# Patient Record
Sex: FEMALE | Race: OTHER
[De-identification: names, ages, dates, MRNs, and addresses within clinical notes are randomized per-mention and may not be internally consistent; named-entity substitution may affect disease eponyms.]

---

## 2021-04-13 ENCOUNTER — HOSPITAL ENCOUNTER (INPATIENT)
Dept: HOSPITAL 54 - TELE | Age: 76
LOS: 4 days | Discharge: HOME | DRG: 720 | End: 2021-04-17
Attending: NURSE PRACTITIONER | Admitting: NURSE PRACTITIONER
Payer: MEDICAID

## 2021-04-13 VITALS — DIASTOLIC BLOOD PRESSURE: 75 MMHG | SYSTOLIC BLOOD PRESSURE: 154 MMHG

## 2021-04-13 VITALS — DIASTOLIC BLOOD PRESSURE: 74 MMHG | SYSTOLIC BLOOD PRESSURE: 157 MMHG

## 2021-04-13 VITALS — HEIGHT: 61 IN | WEIGHT: 127 LBS | BODY MASS INDEX: 23.98 KG/M2

## 2021-04-13 VITALS — SYSTOLIC BLOOD PRESSURE: 126 MMHG | DIASTOLIC BLOOD PRESSURE: 72 MMHG

## 2021-04-13 DIAGNOSIS — J90: ICD-10-CM

## 2021-04-13 DIAGNOSIS — A41.9: Primary | ICD-10-CM

## 2021-04-13 DIAGNOSIS — Z90.12: ICD-10-CM

## 2021-04-13 DIAGNOSIS — Z80.9: ICD-10-CM

## 2021-04-13 DIAGNOSIS — K80.20: ICD-10-CM

## 2021-04-13 DIAGNOSIS — E87.1: ICD-10-CM

## 2021-04-13 DIAGNOSIS — R74.01: ICD-10-CM

## 2021-04-13 DIAGNOSIS — I10: ICD-10-CM

## 2021-04-13 DIAGNOSIS — Z85.3: ICD-10-CM

## 2021-04-13 DIAGNOSIS — R91.8: ICD-10-CM

## 2021-04-13 DIAGNOSIS — E78.5: ICD-10-CM

## 2021-04-13 DIAGNOSIS — E11.9: ICD-10-CM

## 2021-04-13 DIAGNOSIS — I31.3: ICD-10-CM

## 2021-04-13 DIAGNOSIS — J69.0: ICD-10-CM

## 2021-04-13 DIAGNOSIS — Z90.710: ICD-10-CM

## 2021-04-13 DIAGNOSIS — K76.9: ICD-10-CM

## 2021-04-13 LAB
BASOPHILS # BLD AUTO: 0.1 /CMM (ref 0–0.2)
BASOPHILS NFR BLD AUTO: 0.6 % (ref 0–2)
BUN SERPL-MCNC: 8 MG/DL (ref 7–18)
CALCIUM SERPL-MCNC: 8.8 MG/DL (ref 8.5–10.1)
CHLORIDE SERPL-SCNC: 104 MMOL/L (ref 98–107)
CO2 SERPL-SCNC: 26 MMOL/L (ref 21–32)
CREAT SERPL-MCNC: 0.8 MG/DL (ref 0.6–1.3)
EOSINOPHIL NFR BLD AUTO: 6.7 % (ref 0–6)
EOSINOPHIL NFR BLD MANUAL: 5 % (ref 0–4)
GLUCOSE SERPL-MCNC: 92 MG/DL (ref 74–106)
HCT VFR BLD AUTO: 38 % (ref 33–45)
HGB BLD-MCNC: 12.6 G/DL (ref 11.5–14.8)
LYMPHOCYTES NFR BLD AUTO: 1.5 /CMM (ref 0.8–4.8)
LYMPHOCYTES NFR BLD AUTO: 14.9 % (ref 20–44)
LYMPHOCYTES NFR BLD MANUAL: 14 % (ref 16–48)
MCHC RBC AUTO-ENTMCNC: 33 G/DL (ref 31–36)
MCV RBC AUTO: 85 FL (ref 82–100)
MONOCYTES NFR BLD AUTO: 1.5 /CMM (ref 0.1–1.3)
MONOCYTES NFR BLD AUTO: 14.3 % (ref 2–12)
MONOCYTES NFR BLD MANUAL: 14 % (ref 0–11)
NEUTROPHILS # BLD AUTO: 6.5 /CMM (ref 1.8–8.9)
NEUTROPHILS NFR BLD AUTO: 63.5 % (ref 43–81)
NEUTS SEG NFR BLD MANUAL: 67 % (ref 42–76)
PLATELET # BLD AUTO: 351 /CMM (ref 150–450)
POTASSIUM SERPL-SCNC: 3.8 MMOL/L (ref 3.5–5.1)
RBC # BLD AUTO: 4.49 MIL/UL (ref 4–5.2)
SODIUM SERPL-SCNC: 138 MMOL/L (ref 136–145)
WBC NRBC COR # BLD AUTO: 10.3 K/UL (ref 4.3–11)

## 2021-04-13 PROCEDURE — A4216 STERILE WATER/SALINE, 10 ML: HCPCS

## 2021-04-13 PROCEDURE — G0378 HOSPITAL OBSERVATION PER HR: HCPCS

## 2021-04-13 RX ADMIN — INSULIN HUMAN PRN UNITS: 100 INJECTION, SOLUTION PARENTERAL at 17:31

## 2021-04-13 RX ADMIN — TAMOXIFEN CITRATE SCH MG: 10 TABLET, FILM COATED ORAL at 13:08

## 2021-04-13 RX ADMIN — Medication SCH EACH: at 22:18

## 2021-04-13 RX ADMIN — Medication SCH EACH: at 13:10

## 2021-04-13 RX ADMIN — Medication SCH MLS/HR: at 22:54

## 2021-04-13 RX ADMIN — Medication SCH EACH: at 17:29

## 2021-04-13 RX ADMIN — Medication SCH ML: at 18:42

## 2021-04-13 RX ADMIN — Medication SCH MLS/HR: at 15:56

## 2021-04-13 NOTE — NUR
RN NOTES



PATIENT ARRIVED AT UNIT TO ROOM 327-2 [facility]IA [facility], ACCOMPANIED BY 2 EMTS, TRANSPORTED FROM 
[facility]. PATIENT AMBULATORY AND AMBULATED FROM GURNEY TO BED.

## 2021-04-13 NOTE — NUR
RN NOTES



PATIENT SEEN BY DR. HO W/ ORDER FOR US-GUIDED THORACENTESIS OF L LUNG SECONDARY TO 
PLEURAL EFFUSION. PROCEDURE UNDERSTOOD BY PATIENT AS SHE STATED SHE HAD IT BEFORE IN THE 
PAST. CONSENT FORM SIGNED BY PATIENT AND PLACED IN CHART.

## 2021-04-13 NOTE — NUR
TELE RN ADMITTING NOTES



ADMITTED THIS 76-Y/O FEMALE FROM [facility] W/ ADMITTING DIAGNOSIS OF SEPSIS/COUGH AND 
MEDICAL HISTORY OF DM, HTN, HLD, HX OF BREAST CA AND MASTECTOMY, ADMITTED BY CARLOS A OAKLEY NP. PATIENT IS AWAKE AND VERBALLY RESPONSIVE, A/O X3-4, TAGALOG-SPEAKING BUT UNDERSTANDS 
ENGLISH LANGUAGE. BREATHING EVEN AND UNLABORED, TOLERATING ROOM AIR; NOTED W/ OCCASIONAL 
NON-PRODUCTIVE COUGH. NO SWALLOWING ISSUES NOTED. PATIENT VERBALIZED THAT SHE HAS UPPER 
DENTURES. RECENT TRA[place]EL [place] TO THE [facility]. AMBULATORY W/ STEADY GAIT, 
MINIMAL ASSISTANCE AS NEEDED, CONTINENT OF BOWEL AND BLADDER. IV LINE ON RAC #22 INTACT AND 
PATENT. NO SKIN ISSUES NOTED. DTR-IN-LAW AND PATIENT'S SON AWARE OF PATIENT'S ADMISSION. 
SAFETY PRECAUTIONS INSTITUTED: BED LOCKED AND ON LOWEST POSITION, SR UP X2, CALL LIGHT USE 
DEMONSTRATED TO PATIENT FOR STAFF ASSISTANCE. WILL CONTINUE TO MONITOR.

## 2021-04-13 NOTE — NUR
MS/RN OPENING NOTES 



RECEIVED PATIENT IN BED RESTING. PATIENT IS ALERT AND ORIENTED X 3-4. PATIENT BREATHING IS 
EVEN AND UNLABORED. NO SIGNS OF SOB OR RESPIRATORY DISTRESS NOTED. PATIENT STATES NO PAIN AT 
THIS TIME. IV ACCESS IN PLACE. PATIENT SON IS AT BEDSIDE. SAFETY MEASURES ARE IN PLACE, BED 
LOCKED AND PLACED IN THE LOWEST POSITION, SIDE RAILS UP X 2, CALL LIGHT IS WITHIN REACH. 
WILL CONTINUE WITH PATIENT PLAN OF CARE.

## 2021-04-13 NOTE — NUR
RN NOTES



RECEIVED ROXANE CHANEY AT PATIENT'S BEDSIDE; PATIENT ABLE TO DRINK WITHOUT COMPLAINT OF 
NAUSEA/VOMITING.

## 2021-04-13 NOTE — NUR
TELE RN CLOSING NOTES



PATIENT IN BED AWAKE AND VERBALLY RESPONSIVE. A/O X3-4 ABLE TO MAKE NEEDS KNOWN. BREATHING 
EVEN AND UNLABORED, CONTINUES ON ROOM AIR, STILL W/ OCCASIONAL COUGH BUT NO DISCOMFORT 
VERBALIZED BY PATIENT. IV LINE INTACT AND PATENT. PATIENT ABLE TO EAT SMALL AMOUNT EARLIER 
W/O NAUSEA NOR VOMITING. PATIENT'S MEDICAL RECORDS FROM OUTSIDE HOSPITAL RELEASED AS 
CONSENTED BY PATIENT; COPY PLACED IN PATIENT'S CHART. SAFETY PRECS MAINTAINED. WILL ENDORSE 
TO NIGHT SHIFT RN FOR JORGE.

## 2021-04-14 VITALS — SYSTOLIC BLOOD PRESSURE: 133 MMHG | DIASTOLIC BLOOD PRESSURE: 67 MMHG

## 2021-04-14 VITALS — SYSTOLIC BLOOD PRESSURE: 161 MMHG | DIASTOLIC BLOOD PRESSURE: 92 MMHG

## 2021-04-14 VITALS — SYSTOLIC BLOOD PRESSURE: 152 MMHG | DIASTOLIC BLOOD PRESSURE: 67 MMHG

## 2021-04-14 VITALS — DIASTOLIC BLOOD PRESSURE: 65 MMHG | SYSTOLIC BLOOD PRESSURE: 130 MMHG

## 2021-04-14 VITALS — DIASTOLIC BLOOD PRESSURE: 69 MMHG | SYSTOLIC BLOOD PRESSURE: 154 MMHG

## 2021-04-14 LAB
BASOPHILS # BLD AUTO: 0.1 /CMM (ref 0–0.2)
BASOPHILS NFR BLD AUTO: 0.7 % (ref 0–2)
BUN SERPL-MCNC: 10 MG/DL (ref 7–18)
CALCIUM SERPL-MCNC: 9.2 MG/DL (ref 8.5–10.1)
CANCER AG15-3 SERPL-ACNC: 130 U/ML (ref 0–25)
CHLORIDE SERPL-SCNC: 103 MMOL/L (ref 98–107)
CHOLEST SERPL-MCNC: 150 MG/DL (ref ?–200)
CO2 SERPL-SCNC: 28 MMOL/L (ref 21–32)
CREAT SERPL-MCNC: 0.8 MG/DL (ref 0.6–1.3)
EOSINOPHIL NFR BLD AUTO: 6.4 % (ref 0–6)
GLUCOSE SERPL-MCNC: 180 MG/DL (ref 74–106)
HCT VFR BLD AUTO: 37 % (ref 33–45)
HDLC SERPL-MCNC: 49 MG/DL (ref 40–60)
HGB BLD-MCNC: 12.3 G/DL (ref 11.5–14.8)
LDLC SERPL DIRECT ASSAY-MCNC: 84 MG/DL (ref 0–99)
LYMPHOCYTES NFR BLD AUTO: 2.3 /CMM (ref 0.8–4.8)
LYMPHOCYTES NFR BLD AUTO: 22.4 % (ref 20–44)
MAGNESIUM SERPL-MCNC: 2 MG/DL (ref 1.8–2.4)
MCHC RBC AUTO-ENTMCNC: 33 G/DL (ref 31–36)
MCV RBC AUTO: 85 FL (ref 82–100)
MONOCYTES NFR BLD AUTO: 1.3 /CMM (ref 0.1–1.3)
MONOCYTES NFR BLD AUTO: 12.3 % (ref 2–12)
NEUTROPHILS # BLD AUTO: 6.1 /CMM (ref 1.8–8.9)
NEUTROPHILS NFR BLD AUTO: 58.2 % (ref 43–81)
PLATELET # BLD AUTO: 351 /CMM (ref 150–450)
POTASSIUM SERPL-SCNC: 3.8 MMOL/L (ref 3.5–5.1)
RBC # BLD AUTO: 4.36 MIL/UL (ref 4–5.2)
SODIUM SERPL-SCNC: 139 MMOL/L (ref 136–145)
TRIGL SERPL-MCNC: 101 MG/DL (ref 30–150)
WBC NRBC COR # BLD AUTO: 10.4 K/UL (ref 4.3–11)

## 2021-04-14 PROCEDURE — 0W9B3ZZ DRAINAGE OF LEFT PLEURAL CAVITY, PERCUTANEOUS APPROACH: ICD-10-PCS

## 2021-04-14 RX ADMIN — LOSARTAN POTASSIUM SCH MG: 50 TABLET, FILM COATED ORAL at 08:51

## 2021-04-14 RX ADMIN — Medication SCH EACH: at 06:50

## 2021-04-14 RX ADMIN — INSULIN HUMAN PRN UNITS: 100 INJECTION, SOLUTION PARENTERAL at 06:51

## 2021-04-14 RX ADMIN — AMLODIPINE BESYLATE SCH MG: 5 TABLET ORAL at 08:51

## 2021-04-14 RX ADMIN — Medication SCH MLS/HR: at 16:27

## 2021-04-14 RX ADMIN — INSULIN HUMAN PRN UNITS: 100 INJECTION, SOLUTION PARENTERAL at 17:56

## 2021-04-14 RX ADMIN — INSULIN HUMAN PRN UNITS: 100 INJECTION, SOLUTION PARENTERAL at 13:27

## 2021-04-14 RX ADMIN — Medication SCH ML: at 17:23

## 2021-04-14 RX ADMIN — TAMOXIFEN CITRATE SCH MG: 10 TABLET, FILM COATED ORAL at 09:00

## 2021-04-14 RX ADMIN — Medication SCH MLS/HR: at 06:38

## 2021-04-14 RX ADMIN — Medication SCH EACH: at 17:23

## 2021-04-14 RX ADMIN — Medication SCH ML: at 09:09

## 2021-04-14 RX ADMIN — DEXTROSE MONOHYDRATE SCH MLS/HR: 50 INJECTION, SOLUTION INTRAVENOUS at 06:03

## 2021-04-14 RX ADMIN — Medication SCH MLS/HR: at 22:34

## 2021-04-14 RX ADMIN — Medication SCH EACH: at 21:52

## 2021-04-14 RX ADMIN — Medication SCH EACH: at 13:22

## 2021-04-14 RX ADMIN — DEXTROSE MONOHYDRATE SCH MLS/HR: 50 INJECTION, SOLUTION INTRAVENOUS at 08:59

## 2021-04-14 RX ADMIN — PANTOPRAZOLE SODIUM SCH MG: 40 TABLET, DELAYED RELEASE ORAL at 08:51

## 2021-04-14 RX ADMIN — Medication SCH ML: at 13:25

## 2021-04-14 NOTE — NUR
MS RN OPENING NOTES



PATIENT IN BED WITH EYES CLOSED. NO S/S OF DISTRESS NOTED. NO C/O PAIN AT THE MOMENT. SAFETY 
PRECAUTIONS IN PLACE: BED LOWEST POSITION, BREAKS LOCKED, CALL LIGHT WITHIN REACH. WILL 
CONTINUE TO MONITOR.

## 2021-04-14 NOTE — NUR
ms rn

bs- 156 - patient refused coverage due to she just drink glucerna at this time, will recheck 
it at 5pm.

## 2021-04-14 NOTE — NUR
TELE/RN CLOSING NOTES 



PATIENT IN BED SLEEPING EASY TO AROUSE. PATIENT IS ALERT AND ORIENTED X 4. PATIENT BREATHING 
IS EVEN AND UNLABORED. PATIENT SHOWS NO SIGNS OF SOB OR RESPIRATORY DISTRESS. PATIENT STATES 
NO PAIN AT THIS TIME. ALL NEEDS HAVE BEEN MET DURING SHIFT. PATIENT HAS IV ACCESS ON RIGHT 
AC 22 G  INTACT FLUSHING WELL . SAFETY MEASURES ARE IN PLACE, BED IS LOCKED AND PLACED IN 
THE LOWEST POSITION, SIDE RAILS UP X 2, CALL LIGHT IS WITHIN REACH. WILL ENDORSE CARE TO DAY 
SHIFT NURSE.

## 2021-04-14 NOTE — NUR
ms rn

received on bed, awake,alert,oriented x4,not in any form of distress, respirations even and 
unlabored,no sob noted, lungs are diminish,abdomen soft,positive bowel sound,denies pain at 
this time,all needs attended.

## 2021-04-15 VITALS — SYSTOLIC BLOOD PRESSURE: 151 MMHG | DIASTOLIC BLOOD PRESSURE: 84 MMHG

## 2021-04-15 VITALS — SYSTOLIC BLOOD PRESSURE: 122 MMHG | DIASTOLIC BLOOD PRESSURE: 65 MMHG

## 2021-04-15 VITALS — DIASTOLIC BLOOD PRESSURE: 65 MMHG | SYSTOLIC BLOOD PRESSURE: 122 MMHG

## 2021-04-15 LAB
BASOPHILS # BLD AUTO: 0.1 /CMM (ref 0–0.2)
BASOPHILS NFR BLD AUTO: 0.7 % (ref 0–2)
BUN SERPL-MCNC: 8 MG/DL (ref 7–18)
CALCIUM SERPL-MCNC: 8.7 MG/DL (ref 8.5–10.1)
CHLORIDE SERPL-SCNC: 102 MMOL/L (ref 98–107)
CO2 SERPL-SCNC: 32 MMOL/L (ref 21–32)
CREAT SERPL-MCNC: 0.8 MG/DL (ref 0.6–1.3)
EOSINOPHIL NFR BLD AUTO: 5.8 % (ref 0–6)
GLUCOSE SERPL-MCNC: 175 MG/DL (ref 74–106)
HCT VFR BLD AUTO: 37 % (ref 33–45)
HGB BLD-MCNC: 12.1 G/DL (ref 11.5–14.8)
LYMPHOCYTES NFR BLD AUTO: 19.9 % (ref 20–44)
LYMPHOCYTES NFR BLD AUTO: 2 /CMM (ref 0.8–4.8)
MAGNESIUM SERPL-MCNC: 2 MG/DL (ref 1.8–2.4)
MCHC RBC AUTO-ENTMCNC: 33 G/DL (ref 31–36)
MCV RBC AUTO: 86 FL (ref 82–100)
MONOCYTES NFR BLD AUTO: 1.2 /CMM (ref 0.1–1.3)
MONOCYTES NFR BLD AUTO: 12.3 % (ref 2–12)
NEUTROPHILS # BLD AUTO: 6.1 /CMM (ref 1.8–8.9)
NEUTROPHILS NFR BLD AUTO: 61.3 % (ref 43–81)
PLATELET # BLD AUTO: 342 /CMM (ref 150–450)
POTASSIUM SERPL-SCNC: 3.8 MMOL/L (ref 3.5–5.1)
RBC # BLD AUTO: 4.28 MIL/UL (ref 4–5.2)
SODIUM SERPL-SCNC: 139 MMOL/L (ref 136–145)
WBC NRBC COR # BLD AUTO: 10 K/UL (ref 4.3–11)

## 2021-04-15 RX ADMIN — Medication SCH EACH: at 21:56

## 2021-04-15 RX ADMIN — Medication SCH ML: at 12:00

## 2021-04-15 RX ADMIN — TAMOXIFEN CITRATE SCH MG: 10 TABLET, FILM COATED ORAL at 09:15

## 2021-04-15 RX ADMIN — Medication SCH EACH: at 21:10

## 2021-04-15 RX ADMIN — METRONIDAZOLE SCH MG: 500 TABLET ORAL at 15:05

## 2021-04-15 RX ADMIN — INSULIN HUMAN PRN UNITS: 100 INJECTION, SOLUTION PARENTERAL at 11:47

## 2021-04-15 RX ADMIN — PANTOPRAZOLE SODIUM SCH MG: 40 TABLET, DELAYED RELEASE ORAL at 09:25

## 2021-04-15 RX ADMIN — Medication SCH MLS/HR: at 07:26

## 2021-04-15 RX ADMIN — DEXTROSE MONOHYDRATE SCH MLS/HR: 50 INJECTION, SOLUTION INTRAVENOUS at 06:02

## 2021-04-15 RX ADMIN — Medication SCH EACH: at 11:46

## 2021-04-15 RX ADMIN — Medication SCH ML: at 09:25

## 2021-04-15 RX ADMIN — Medication SCH EACH: at 06:27

## 2021-04-15 RX ADMIN — METRONIDAZOLE SCH MG: 500 TABLET ORAL at 23:06

## 2021-04-15 RX ADMIN — Medication SCH EACH: at 18:10

## 2021-04-15 RX ADMIN — INSULIN HUMAN PRN UNITS: 100 INJECTION, SOLUTION PARENTERAL at 22:12

## 2021-04-15 RX ADMIN — INSULIN HUMAN PRN UNITS: 100 INJECTION, SOLUTION PARENTERAL at 18:30

## 2021-04-15 RX ADMIN — Medication SCH ML: at 17:00

## 2021-04-15 RX ADMIN — DEXTROSE MONOHYDRATE SCH MLS/HR: 50 INJECTION, SOLUTION INTRAVENOUS at 09:14

## 2021-04-15 RX ADMIN — AMLODIPINE BESYLATE SCH MG: 5 TABLET ORAL at 09:15

## 2021-04-15 RX ADMIN — INSULIN HUMAN PRN UNITS: 100 INJECTION, SOLUTION PARENTERAL at 06:53

## 2021-04-15 RX ADMIN — LOSARTAN POTASSIUM SCH MG: 50 TABLET, FILM COATED ORAL at 09:16

## 2021-04-15 NOTE — NUR
MS RN CLOSING NOTES



PATIENT IN BED WITH EYES CLOSED, BARELY WENT TO BED. PATIENT TOLERATING ROOM AIR. NO S/S OF 
RESPIRATORY DISTRESS. NO C/O OF PAIN AT THE MOMENT. PATIENT ABLE TO MAKE NEEDS KNOWN. ALL 
NEEDS ATTENDED. ALL SCHEDULED MEDS ADMINISTERED. SAFETY PRECAUTIONS KEPT IN PLACE THE WHOLE 
TIME: BED IN LOWEST, LOCKED POSITION; CALL LIGHT WITHIN REACH. NO SIGNIFICANT CHANGE FROM 
LAST SHIFT. WILL ENDORSE CARE TO MORNING RN.

## 2021-04-15 NOTE — NUR
MS RN NOTES



CONSENT SIGNED BY PATIENT FOR CT OF ABDOMEN/PELVIS WITH CONTRAST AND CT GUIDED LUNG MASS 
BIOPSY. CONSENT FLAGGED IN THE CHART.

## 2021-04-15 NOTE — NUR
MS RN OPENING NOTES



PATIENT SITTING IN BED. A/OX4. NO S/S OF DISTRESS NOTED. RESPIRATION EVEN AND UNLABORED IN 
ROOM AIR. NO C/O PAIN AT THE MOMENT. SAFETY PRECAUTIONS IN PLACE: BED LOWEST POSITION, 
BREAKS LOCKED, CALL LIGHT WITHIN REACH. WILL CONTINUE TO MONITOR.

## 2021-04-15 NOTE — NUR
ms rn

received on bed, awake,alert,oriented x4,not in any form of distress, respirations even and 
unlabored,no sob noted, lungs are diminish, abdomen soft,positive bowel sounds,denies pain 
at this time.

## 2021-04-16 VITALS — DIASTOLIC BLOOD PRESSURE: 64 MMHG | SYSTOLIC BLOOD PRESSURE: 132 MMHG

## 2021-04-16 VITALS — DIASTOLIC BLOOD PRESSURE: 56 MMHG | SYSTOLIC BLOOD PRESSURE: 123 MMHG

## 2021-04-16 VITALS — DIASTOLIC BLOOD PRESSURE: 64 MMHG | SYSTOLIC BLOOD PRESSURE: 135 MMHG

## 2021-04-16 LAB
BASOPHILS # BLD AUTO: 0.1 /CMM (ref 0–0.2)
BASOPHILS NFR BLD AUTO: 0.8 % (ref 0–2)
BUN SERPL-MCNC: 8 MG/DL (ref 7–18)
CALCIUM SERPL-MCNC: 8.2 MG/DL (ref 8.5–10.1)
CHLORIDE SERPL-SCNC: 102 MMOL/L (ref 98–107)
CO2 SERPL-SCNC: 30 MMOL/L (ref 21–32)
CREAT SERPL-MCNC: 0.9 MG/DL (ref 0.6–1.3)
EOSINOPHIL NFR BLD AUTO: 4.7 % (ref 0–6)
GLUCOSE SERPL-MCNC: 138 MG/DL (ref 74–106)
HCT VFR BLD AUTO: 40 % (ref 33–45)
HGB BLD-MCNC: 12.7 G/DL (ref 11.5–14.8)
LYMPHOCYTES NFR BLD AUTO: 24.4 % (ref 20–44)
LYMPHOCYTES NFR BLD AUTO: 3.2 /CMM (ref 0.8–4.8)
MAGNESIUM SERPL-MCNC: 1.8 MG/DL (ref 1.8–2.4)
MCHC RBC AUTO-ENTMCNC: 32 G/DL (ref 31–36)
MCV RBC AUTO: 86 FL (ref 82–100)
MONOCYTES NFR BLD AUTO: 1.4 /CMM (ref 0.1–1.3)
MONOCYTES NFR BLD AUTO: 10.3 % (ref 2–12)
NEUTROPHILS # BLD AUTO: 7.9 /CMM (ref 1.8–8.9)
NEUTROPHILS NFR BLD AUTO: 59.8 % (ref 43–81)
PLATELET # BLD AUTO: 363 /CMM (ref 150–450)
POTASSIUM SERPL-SCNC: 4.1 MMOL/L (ref 3.5–5.1)
RBC # BLD AUTO: 4.62 MIL/UL (ref 4–5.2)
SODIUM SERPL-SCNC: 141 MMOL/L (ref 136–145)
WBC NRBC COR # BLD AUTO: 13.2 K/UL (ref 4.3–11)

## 2021-04-16 PROCEDURE — 0BBJ3ZX EXCISION OF LEFT LOWER LUNG LOBE, PERCUTANEOUS APPROACH, DIAGNOSTIC: ICD-10-PCS

## 2021-04-16 RX ADMIN — DEXTROSE MONOHYDRATE SCH MLS/HR: 50 INJECTION, SOLUTION INTRAVENOUS at 08:47

## 2021-04-16 RX ADMIN — Medication SCH EACH: at 22:43

## 2021-04-16 RX ADMIN — PANTOPRAZOLE SODIUM SCH MG: 40 TABLET, DELAYED RELEASE ORAL at 07:38

## 2021-04-16 RX ADMIN — INSULIN HUMAN PRN UNITS: 100 INJECTION, SOLUTION PARENTERAL at 22:44

## 2021-04-16 RX ADMIN — Medication SCH EACH: at 11:51

## 2021-04-16 RX ADMIN — AMLODIPINE BESYLATE SCH MG: 5 TABLET ORAL at 08:48

## 2021-04-16 RX ADMIN — LOSARTAN POTASSIUM SCH MG: 50 TABLET, FILM COATED ORAL at 08:48

## 2021-04-16 RX ADMIN — INSULIN HUMAN PRN UNITS: 100 INJECTION, SOLUTION PARENTERAL at 11:51

## 2021-04-16 RX ADMIN — Medication SCH ML: at 11:52

## 2021-04-16 RX ADMIN — Medication SCH ML: at 08:00

## 2021-04-16 RX ADMIN — TAMOXIFEN CITRATE SCH MG: 10 TABLET, FILM COATED ORAL at 08:49

## 2021-04-16 RX ADMIN — DEXTROSE MONOHYDRATE SCH MLS/HR: 50 INJECTION, SOLUTION INTRAVENOUS at 06:11

## 2021-04-16 RX ADMIN — METRONIDAZOLE SCH MG: 500 TABLET ORAL at 22:43

## 2021-04-16 RX ADMIN — Medication SCH ML: at 17:11

## 2021-04-16 RX ADMIN — Medication SCH EACH: at 06:50

## 2021-04-16 RX ADMIN — METRONIDAZOLE SCH MG: 500 TABLET ORAL at 17:09

## 2021-04-16 RX ADMIN — METRONIDAZOLE SCH MG: 500 TABLET ORAL at 06:27

## 2021-04-16 RX ADMIN — Medication SCH EACH: at 17:12

## 2021-04-16 NOTE — NUR
MS RN OPENING NOTE



PATIENT IN BED RESTING, ALERT ORIENTED X 4. NO ACUTE DISTRESS NOTED. BREATHING EVEN AND 
UNLABORED, PATIENT STILL HAS COUGH PRESENT. IV ACCESS PATENT AND INTACT, NO REDNESS NO 
SWELLING NOTED. SAFETY MEASURES IN PLACE: BED IN LOCKED AND LOWEST POSITION, CALL LIGHT 
WITHIN REACH, SIDE RAILS UP. WILL MONITOR PATIENT CLOSELY.

## 2021-04-16 NOTE — NUR
MS RN NOTES

PATIENT CAME BACK FROM CT IN STABLE CONDITION. NO ACUTE DISTRESS NOTED. PER DR AUSTIN PATIENT 
NEEDS CHEST XRAY AFTER 2 HOURS AND KEEP NPO FOR NOW.ORDERS CLARIFIED AND READBACK WITH MD, 
NOTED AND CARRIED OUT

## 2021-04-16 NOTE — NUR
MS RN NOTES

PATIENT IN BED ALERT ORIENTED X 4. NO ACUTE DISTRESS NOTED. BREATHING UNLABORED, IV ACCESS 
PATENT AND INTACT, NO REDNESS NO SWELLING NOTED. NEEDS ATTENDED AND ANTICIPATED . SAFETY 
MEASURES IN PLACE. CALL LIGHT WITHIN REACH. WILL ENDORSE TO NIGHT NURSE FOR CONTINUITY OF 
CARE

## 2021-04-16 NOTE — NUR
MS RN CLOSING NOTES



PATIENT IN BED. A/OX4. NO SIGNS OF DISTRESS NOTED. RESPIRATORY EVEN AND UNLABORED. NO C/O 
PAIN AT THE MOMENT. PATIENT ABLE TO MAKE NEEDS KNOWN. ALL NEEDS ATTENDED. ALL SCHEDULED MEDS 
ADMINISTERED. SAFETY PRECAUTIONS KEPT IN PLACE THE WHOLE SHIFT: BED IN LOWEST, LOCKED 
POSITION, CALL LIGHT WITHIN REACH. PATIENT SEEN BY DR. LENNON. CONSENTS WITNESSED BY RN AND 
SIGNED BY PATIENT. CONSENTS FLAGGED IN CHART. NO SIGNIFICANT CHANGES SINCE LAST SHIFT. WILL 
ENDORSE CARE TO MORNING SHIFT RN.

## 2021-04-16 NOTE — NUR
MS RN NOTES

PATIENT IN BED ALERT ORIENTED X 4. NO ACUTE DISTRESS NOTED. BREATHING UNLABORED, IV ACCESS 
PATENT AND INTACT, NO REDNESS NO SWELLING NOTED. SAFETY MEASURES IN PLACE. CALL LIGHT WITHIN 
REACH. WILL CONTINUE TO MONITOR ACCORDINGLY.

## 2021-04-16 NOTE — NUR
MS RN NOTES

CLARIFIED WITH NP  IF STILL OK TO GIVE FLAGYL 500MG PO SCHEDULED FOR 1500, SAID IT'S STILL 
OK TO GIVE NOW AND MAY GIVE TONIGHT AS SCHEDULED

## 2021-04-17 VITALS — DIASTOLIC BLOOD PRESSURE: 82 MMHG | SYSTOLIC BLOOD PRESSURE: 131 MMHG

## 2021-04-17 VITALS — DIASTOLIC BLOOD PRESSURE: 60 MMHG | SYSTOLIC BLOOD PRESSURE: 133 MMHG

## 2021-04-17 LAB
BASOPHILS # BLD AUTO: 0 /CMM (ref 0–0.2)
BASOPHILS NFR BLD AUTO: 0.3 % (ref 0–2)
BUN SERPL-MCNC: 10 MG/DL (ref 7–18)
CALCIUM SERPL-MCNC: 8.1 MG/DL (ref 8.5–10.1)
CHLORIDE SERPL-SCNC: 101 MMOL/L (ref 98–107)
CO2 SERPL-SCNC: 26 MMOL/L (ref 21–32)
CREAT SERPL-MCNC: 0.9 MG/DL (ref 0.6–1.3)
EOSINOPHIL NFR BLD AUTO: 2.6 % (ref 0–6)
GLUCOSE SERPL-MCNC: 279 MG/DL (ref 74–106)
HCT VFR BLD AUTO: 38 % (ref 33–45)
HGB BLD-MCNC: 12 G/DL (ref 11.5–14.8)
LYMPHOCYTES NFR BLD AUTO: 1.8 /CMM (ref 0.8–4.8)
LYMPHOCYTES NFR BLD AUTO: 15 % (ref 20–44)
MCHC RBC AUTO-ENTMCNC: 32 G/DL (ref 31–36)
MCV RBC AUTO: 87 FL (ref 82–100)
MONOCYTES NFR BLD AUTO: 1.3 /CMM (ref 0.1–1.3)
MONOCYTES NFR BLD AUTO: 10.5 % (ref 2–12)
NEUTROPHILS # BLD AUTO: 8.8 /CMM (ref 1.8–8.9)
NEUTROPHILS NFR BLD AUTO: 71.6 % (ref 43–81)
PLATELET # BLD AUTO: 306 /CMM (ref 150–450)
POTASSIUM SERPL-SCNC: 4.8 MMOL/L (ref 3.5–5.1)
RBC # BLD AUTO: 4.32 MIL/UL (ref 4–5.2)
SODIUM SERPL-SCNC: 135 MMOL/L (ref 136–145)
WBC NRBC COR # BLD AUTO: 12.2 K/UL (ref 4.3–11)

## 2021-04-17 RX ADMIN — Medication SCH ML: at 08:59

## 2021-04-17 RX ADMIN — Medication SCH ML: at 11:06

## 2021-04-17 RX ADMIN — AMLODIPINE BESYLATE SCH MG: 5 TABLET ORAL at 09:01

## 2021-04-17 RX ADMIN — LOSARTAN POTASSIUM SCH MG: 50 TABLET, FILM COATED ORAL at 09:01

## 2021-04-17 RX ADMIN — DEXTROSE MONOHYDRATE SCH MLS/HR: 50 INJECTION, SOLUTION INTRAVENOUS at 06:19

## 2021-04-17 RX ADMIN — METRONIDAZOLE SCH MG: 500 TABLET ORAL at 14:12

## 2021-04-17 RX ADMIN — PANTOPRAZOLE SODIUM SCH MG: 40 TABLET, DELAYED RELEASE ORAL at 09:01

## 2021-04-17 RX ADMIN — Medication SCH EACH: at 09:19

## 2021-04-17 RX ADMIN — DEXTROSE MONOHYDRATE SCH MLS/HR: 50 INJECTION, SOLUTION INTRAVENOUS at 08:59

## 2021-04-17 RX ADMIN — INSULIN HUMAN PRN UNITS: 100 INJECTION, SOLUTION PARENTERAL at 11:16

## 2021-04-17 RX ADMIN — Medication SCH EACH: at 11:06

## 2021-04-17 RX ADMIN — TAMOXIFEN CITRATE SCH MG: 10 TABLET, FILM COATED ORAL at 09:01

## 2021-04-17 RX ADMIN — METRONIDAZOLE SCH MG: 500 TABLET ORAL at 06:19

## 2021-04-17 NOTE — NUR
RN OPENING NOTE



PATIENT IN BED RESTING, ALERT ORIENTED X 4. NO ACUTE DISTRESS NOTED. BREATHING EVEN AND 
UNLABORED, PATIENT STILL HAS COUGH PRESENT. IV ACCESS PATENT AND INTACT, NO REDNESS NO 
SWELLING NOTED. SAFETY MEASURES IN PLACE: BED IN LOCKED AND LOWEST POSITION, CALL LIGHT 
WITHIN REACH, SIDE RAILS UP.

## 2021-04-17 NOTE — NUR
MS RN CLOSING NOTE



PATIENT IN BED RESTING, EASILY AROUSABLE, A/O X 4. NOC/O PAIN OR DISCOMFORT AT THIS TIME. 
BREATHING EVEN AND UNLABORED, COUGH STILL PRESENT. IV ACCESS PATENT AND INTACT, NO REDNESS 
NO SWELLING NOTED. ATB THERAPY CARRIED OUT, ROUTINE AND PRN MEDS GIVEN. ALL NEEDS MET AND 
ATTENDED. PATIENT AMBULATORY. SAFETY MEASURES MAINTAINED: BED IN LOCKED AND LOWEST POSITION, 
CALL LIGHT WITHIN REACH, SIDE RAILS UP. WILL ENDORSE TO DAY SHIFT NURSE FOR JORGE.

## 2021-04-17 NOTE — NUR
DISCHARGE NOTE



PATIENT DISCHARGED AT 1530 IN STABLE CONDITION, ALL BELONGINGS ACCOUNTED FOR. VITAL SIGNS 
OBTAINED, BELONGINGS SHEET AND DISCHARGE SUMMARY SIGNED AND ACCOUNTED FOR. PATIENT TAKEN 
DOWNSTAIRS BY WHEELCHAIR AND MET DAUGHTER IN LAW, DROPPED OFF IN CAR AND DISCHARGED. 
MEDICATION PRESCRIPTIONS AND LIST CONFIRMED WITH PHARMACY. PATIENT MADE AWARE TO F/U with 
PCP IN ONE WEEK FOR ONCOLOGY.

## 2021-04-21 ENCOUNTER — HOSPITAL ENCOUNTER (INPATIENT)
Dept: HOSPITAL 54 - ER | Age: 76
LOS: 8 days | Discharge: HOME | DRG: 710 | End: 2021-04-29
Attending: NURSE PRACTITIONER | Admitting: NURSE PRACTITIONER
Payer: MEDICAID

## 2021-04-21 VITALS — SYSTOLIC BLOOD PRESSURE: 167 MMHG | DIASTOLIC BLOOD PRESSURE: 79 MMHG

## 2021-04-21 VITALS — SYSTOLIC BLOOD PRESSURE: 133 MMHG | DIASTOLIC BLOOD PRESSURE: 100 MMHG

## 2021-04-21 VITALS — HEIGHT: 58 IN | BODY MASS INDEX: 26.24 KG/M2 | WEIGHT: 125 LBS

## 2021-04-21 VITALS — SYSTOLIC BLOOD PRESSURE: 158 MMHG | DIASTOLIC BLOOD PRESSURE: 79 MMHG

## 2021-04-21 DIAGNOSIS — I50.9: ICD-10-CM

## 2021-04-21 DIAGNOSIS — R04.2: ICD-10-CM

## 2021-04-21 DIAGNOSIS — D64.9: ICD-10-CM

## 2021-04-21 DIAGNOSIS — E11.9: ICD-10-CM

## 2021-04-21 DIAGNOSIS — Z79.899: ICD-10-CM

## 2021-04-21 DIAGNOSIS — Z79.83: ICD-10-CM

## 2021-04-21 DIAGNOSIS — K66.0: ICD-10-CM

## 2021-04-21 DIAGNOSIS — R91.1: ICD-10-CM

## 2021-04-21 DIAGNOSIS — C50.919: ICD-10-CM

## 2021-04-21 DIAGNOSIS — J98.4: ICD-10-CM

## 2021-04-21 DIAGNOSIS — C78.00: ICD-10-CM

## 2021-04-21 DIAGNOSIS — Z79.02: ICD-10-CM

## 2021-04-21 DIAGNOSIS — J90: ICD-10-CM

## 2021-04-21 DIAGNOSIS — N13.30: ICD-10-CM

## 2021-04-21 DIAGNOSIS — E46: ICD-10-CM

## 2021-04-21 DIAGNOSIS — I11.0: ICD-10-CM

## 2021-04-21 DIAGNOSIS — A41.9: Primary | ICD-10-CM

## 2021-04-21 DIAGNOSIS — Z79.4: ICD-10-CM

## 2021-04-21 DIAGNOSIS — Z90.13: ICD-10-CM

## 2021-04-21 DIAGNOSIS — E78.5: ICD-10-CM

## 2021-04-21 DIAGNOSIS — N28.9: ICD-10-CM

## 2021-04-21 DIAGNOSIS — E87.2: ICD-10-CM

## 2021-04-21 LAB
ALBUMIN SERPL BCP-MCNC: 2.6 G/DL (ref 3.4–5)
ALP SERPL-CCNC: 83 U/L (ref 46–116)
ALT SERPL W P-5'-P-CCNC: 26 U/L (ref 12–78)
AST SERPL W P-5'-P-CCNC: 59 U/L (ref 15–37)
BASOPHILS # BLD AUTO: 0.1 /CMM (ref 0–0.2)
BASOPHILS NFR BLD AUTO: 0.6 % (ref 0–2)
BILIRUB DIRECT SERPL-MCNC: 0.1 MG/DL (ref 0–0.2)
BILIRUB SERPL-MCNC: 0.5 MG/DL (ref 0.2–1)
BUN SERPL-MCNC: 12 MG/DL (ref 7–18)
CALCIUM SERPL-MCNC: 9.3 MG/DL (ref 8.5–10.1)
CHLORIDE SERPL-SCNC: 99 MMOL/L (ref 98–107)
CO2 SERPL-SCNC: 27 MMOL/L (ref 21–32)
CREAT SERPL-MCNC: 0.9 MG/DL (ref 0.6–1.3)
EOSINOPHIL NFR BLD AUTO: 2.5 % (ref 0–6)
GLUCOSE SERPL-MCNC: 178 MG/DL (ref 74–106)
HCT VFR BLD AUTO: 38 % (ref 33–45)
HGB BLD-MCNC: 12.2 G/DL (ref 11.5–14.8)
LYMPHOCYTES NFR BLD AUTO: 1.7 /CMM (ref 0.8–4.8)
LYMPHOCYTES NFR BLD AUTO: 11.7 % (ref 20–44)
MCHC RBC AUTO-ENTMCNC: 32 G/DL (ref 31–36)
MCV RBC AUTO: 88 FL (ref 82–100)
MONOCYTES NFR BLD AUTO: 1.4 /CMM (ref 0.1–1.3)
MONOCYTES NFR BLD AUTO: 9.4 % (ref 2–12)
NEUTROPHILS # BLD AUTO: 11 /CMM (ref 1.8–8.9)
NEUTROPHILS NFR BLD AUTO: 75.8 % (ref 43–81)
NT-PROBNP SERPL-MCNC: 57 PG/ML (ref 0–125)
PLATELET # BLD AUTO: 315 /CMM (ref 150–450)
POTASSIUM SERPL-SCNC: 5 MMOL/L (ref 3.5–5.1)
PROT SERPL-MCNC: 7.6 G/DL (ref 6.4–8.2)
RBC # BLD AUTO: 4.32 MIL/UL (ref 4–5.2)
SODIUM SERPL-SCNC: 136 MMOL/L (ref 136–145)
WBC NRBC COR # BLD AUTO: 14.4 K/UL (ref 4.3–11)

## 2021-04-21 PROCEDURE — G0378 HOSPITAL OBSERVATION PER HR: HCPCS

## 2021-04-21 PROCEDURE — A6403 STERILE GAUZE>16 <= 48 SQ IN: HCPCS

## 2021-04-21 PROCEDURE — L3908 WHO COCK-UP NONMOLDE PRE OTS: HCPCS

## 2021-04-21 PROCEDURE — C1751 CATH, INF, PER/CENT/MIDLINE: HCPCS

## 2021-04-21 PROCEDURE — A6253 ABSORPT DRG > 48 SQ IN W/O B: HCPCS

## 2021-04-21 PROCEDURE — C9803 HOPD COVID-19 SPEC COLLECT: HCPCS

## 2021-04-21 RX ADMIN — ENOXAPARIN SODIUM SCH MG: 40 INJECTION SUBCUTANEOUS at 07:19

## 2021-04-21 RX ADMIN — PANTOPRAZOLE SODIUM SCH MG: 40 TABLET, DELAYED RELEASE ORAL at 07:33

## 2021-04-21 RX ADMIN — SODIUM CHLORIDE PRN MLS/HR: 4.5 INJECTION, SOLUTION INTRAVENOUS at 11:37

## 2021-04-21 RX ADMIN — PIPERACILLIN SODIUM AND TAZOBACTAM SODIUM SCH MLS/HR: .375; 3 INJECTION, POWDER, LYOPHILIZED, FOR SOLUTION INTRAVENOUS at 11:36

## 2021-04-21 RX ADMIN — PIPERACILLIN SODIUM AND TAZOBACTAM SODIUM SCH MLS/HR: .375; 3 INJECTION, POWDER, LYOPHILIZED, FOR SOLUTION INTRAVENOUS at 17:14

## 2021-04-21 NOTE — NUR
ASSESSED PT ON BED AWAKE AND ALERT, NOT IN RESPIRATORY DISTRESS, V/S STABLE. 
KEPT RESTED AND COMFORTABLE. WILL CONTINUE TO MONITOR.

## 2021-04-21 NOTE — NUR
TELE ADMIT FROM ER



AFTER REPORT RECEIVE FROM NICOLE SLATER. PATIENT ORIENTED TO PRIMARY RN, UNIT, ROOM, BED, AND UNIT 
POLICIES REGARDING PATIENT CARE AND VISITING HOURS. PATIENT NOW ON CONTINUOUS TELEMETRY 
MONITORING. PT WEIGHED BY BLAINE AND ENCOURAGED TO CALL IF THE NEED SOMETHING. ALL 
QUESTIONS AND CONCERNS ADDRESSED. PATIENT VERBALIZED UNDERSTANDING.

## 2021-04-21 NOTE — NUR
RN NOTES

PATIENT COMPLIANT WITH CARE, ALL MD ORDERS AND MEDICATIONS CARRIED OUT, REQUESTED BLOOD 
SUGAR MEDICATIONS AS REFERRED TO HOME RECONCILIATION OF BS MEDICATIONS AWAITING NEW ORDERS 
FOR THIS DG, IV SITE INTACT, PATENT, NO INFLAMMATION AND NO REDNESS AT IV SITE NOTED, ALL 
NEEDS MET AND PROVIDED MEALS AND NOURISHMENT AS NEEDED , ON MS DUE TO NOT HAVING HER UPPER 
OR LOWER DENTURES AT THIS TIME, GOOD APPETITE, NO ASPIRATIONS NOTED, CALL LIGHT IN REACH. 
ABLE TO MAKE NEEDS KNOWN, ALL PPE WORN DURING CARE AND TREATMENTS STANDARD AND DROPLET 
PRECAUTIONS CARRIED OUT.

## 2021-04-21 NOTE — NUR
PT BIB SON C/O COUGHING BLOOD SINCE 9PM. PLACED IN BED 5 ON MONITOR AND PULSE 
OX. PT STATED SHE WAS DISCHARGED FROM THE HOSPITAL ABOUT A WEEK AGO FOR TB. 
PLACED IN ER BED 5.

## 2021-04-22 VITALS — DIASTOLIC BLOOD PRESSURE: 71 MMHG | SYSTOLIC BLOOD PRESSURE: 145 MMHG

## 2021-04-22 VITALS — DIASTOLIC BLOOD PRESSURE: 78 MMHG | SYSTOLIC BLOOD PRESSURE: 156 MMHG

## 2021-04-22 VITALS — SYSTOLIC BLOOD PRESSURE: 145 MMHG | DIASTOLIC BLOOD PRESSURE: 71 MMHG

## 2021-04-22 VITALS — SYSTOLIC BLOOD PRESSURE: 156 MMHG | DIASTOLIC BLOOD PRESSURE: 78 MMHG

## 2021-04-22 VITALS — DIASTOLIC BLOOD PRESSURE: 72 MMHG | SYSTOLIC BLOOD PRESSURE: 156 MMHG

## 2021-04-22 VITALS — SYSTOLIC BLOOD PRESSURE: 156 MMHG | DIASTOLIC BLOOD PRESSURE: 72 MMHG

## 2021-04-22 LAB
BASOPHILS # BLD AUTO: 0.1 /CMM (ref 0–0.2)
BASOPHILS NFR BLD AUTO: 0.7 % (ref 0–2)
BUN SERPL-MCNC: 8 MG/DL (ref 7–18)
CALCIUM SERPL-MCNC: 8.5 MG/DL (ref 8.5–10.1)
CHLORIDE SERPL-SCNC: 103 MMOL/L (ref 98–107)
CO2 SERPL-SCNC: 30 MMOL/L (ref 21–32)
CREAT SERPL-MCNC: 0.8 MG/DL (ref 0.6–1.3)
EOSINOPHIL NFR BLD AUTO: 3.8 % (ref 0–6)
GLUCOSE SERPL-MCNC: 158 MG/DL (ref 74–106)
HCT VFR BLD AUTO: 35 % (ref 33–45)
HGB BLD-MCNC: 11.8 G/DL (ref 11.5–14.8)
LYMPHOCYTES NFR BLD AUTO: 1.6 /CMM (ref 0.8–4.8)
LYMPHOCYTES NFR BLD AUTO: 14.7 % (ref 20–44)
MAGNESIUM SERPL-MCNC: 1.9 MG/DL (ref 1.8–2.4)
MCHC RBC AUTO-ENTMCNC: 34 G/DL (ref 31–36)
MCV RBC AUTO: 86 FL (ref 82–100)
MONOCYTES NFR BLD AUTO: 1.5 /CMM (ref 0.1–1.3)
MONOCYTES NFR BLD AUTO: 13.4 % (ref 2–12)
NEUTROPHILS # BLD AUTO: 7.4 /CMM (ref 1.8–8.9)
NEUTROPHILS NFR BLD AUTO: 67.4 % (ref 43–81)
PHOSPHATE SERPL-MCNC: 4.3 MG/DL (ref 2.5–4.9)
PLATELET # BLD AUTO: 328 /CMM (ref 150–450)
POTASSIUM SERPL-SCNC: 3.7 MMOL/L (ref 3.5–5.1)
RBC # BLD AUTO: 4.08 MIL/UL (ref 4–5.2)
SODIUM SERPL-SCNC: 141 MMOL/L (ref 136–145)
WBC NRBC COR # BLD AUTO: 10.9 K/UL (ref 4.3–11)

## 2021-04-22 RX ADMIN — PIPERACILLIN SODIUM AND TAZOBACTAM SODIUM SCH MLS/HR: .375; 3 INJECTION, POWDER, LYOPHILIZED, FOR SOLUTION INTRAVENOUS at 02:01

## 2021-04-22 RX ADMIN — PANTOPRAZOLE SODIUM SCH MG: 40 TABLET, DELAYED RELEASE ORAL at 08:26

## 2021-04-22 RX ADMIN — ENOXAPARIN SODIUM SCH MG: 40 INJECTION SUBCUTANEOUS at 05:42

## 2021-04-22 RX ADMIN — PIPERACILLIN SODIUM AND TAZOBACTAM SODIUM SCH MLS/HR: .375; 3 INJECTION, POWDER, LYOPHILIZED, FOR SOLUTION INTRAVENOUS at 18:07

## 2021-04-22 RX ADMIN — PIPERACILLIN SODIUM AND TAZOBACTAM SODIUM SCH MLS/HR: .375; 3 INJECTION, POWDER, LYOPHILIZED, FOR SOLUTION INTRAVENOUS at 09:06

## 2021-04-22 RX ADMIN — DEXTROSE MONOHYDRATE SCH MLS/HR: 50 INJECTION, SOLUTION INTRAVENOUS at 20:01

## 2021-04-22 NOTE — NUR
MS/RN CLOSING NOTES

PATIENT IS ON BED ALERT AND ORIENTED X 4.PATIENT IS ON ROOM AIR SATURATION 97%. PATIENT IN 
NO APPARENT RESPIRATORY DISTRESS NOTED. NO COMPLAINED OF PAIN NOTED AT THIS TIME.  IV ACCESS 
AT RIGHT WRIST #20G AND RIGHT HAND # 22 WITH IV FLUID OF 1/2 NS 1L AT 75 ML/HOUR ON AND 
INFUSING WELL. SEEN AND EXAMINED BY MD WITH ORDERS MADE AND CARRIED OUT. ALL DUE MEDICATIONS 
WAS GIVEN. SAFETY PRECAUTIONS WAS IN PLACED. ALL NEEDS WAS ATTENDED. BED IN LOWEST POSITION 
AND LOCKED. SIDERAILS UP X2. CALL LIGHT WITHIN REACH. WILL ENDORSED TO NIGHT SHIFT FOR JORGE.

## 2021-04-22 NOTE — NUR
MS RN OPENING NOTES



PATIENT IN BED. A/OX4 AND ABLE TO MAKE HER NEEDS KNOWN. NO S/S OF DISTRESS. TOLERATING ROOM 
AIR. NO C/O PAIN AT THE MOMENT. SAFETY IN PLACE: BED IN LOWEST, LOCKED POSITION; CALL LIGHT 
WITHIN REACH. WILL CONTINUE TO MONITOR.

## 2021-04-22 NOTE — NUR
MS/RN OPENING NOTES

RECEIVED PATIENT ON BED AWAKE ALERT AND ORIENTED X3. PATIENT IN NO APPARENT RESPIRATORY 
DISTRESS NOTED. NO COMPLAINED OF PAIN NOTED. WILL CONTINUE TO MONITOR.

## 2021-04-22 NOTE — NUR
RT NOTE:

ATTEMPTED TO OBTAIN SPUTUM SAMPLE BUT PATIENT STATES SHE CAN NOT DO IT RIGHT NOW BUT WILL 
ATTEMPT AGAIN LATER. PATIENT WAS INSTRUCTED TO COUGH AND SPIT PHLEGM IN STERILE CUT 
PROVIDED. PATIENT STATES THAT SHE WILL CALL WHEN SAMPLE IS OBTAINED. NURSE NOTIFIED.

## 2021-04-22 NOTE — NUR
STEADY ON HER LEGS AMBULATES TO THE BATHROOM WITH STANDBY ASSIST

UNABLE TO OBTAIN ORDERED UA 1ST TIME SPILLED OUT OF MEASURING HAT 2ND RHONA SHE ,ISSED TO VOID 
IN THE HAT

PRODUCTIVE COUGH NO BLOOD TINGED SPUTUM SEEN

D/T HEAVY COUGHING INCONTENENT URINE FREQ

## 2021-04-23 VITALS — DIASTOLIC BLOOD PRESSURE: 69 MMHG | SYSTOLIC BLOOD PRESSURE: 139 MMHG

## 2021-04-23 VITALS — SYSTOLIC BLOOD PRESSURE: 127 MMHG | DIASTOLIC BLOOD PRESSURE: 66 MMHG

## 2021-04-23 VITALS — SYSTOLIC BLOOD PRESSURE: 124 MMHG | DIASTOLIC BLOOD PRESSURE: 61 MMHG

## 2021-04-23 LAB
BASOPHILS # BLD AUTO: 0.1 /CMM (ref 0–0.2)
BASOPHILS NFR BLD AUTO: 0.6 % (ref 0–2)
BILIRUB DIRECT SERPL-MCNC: 0.2 MG/DL (ref 0–0.2)
BILIRUB SERPL-MCNC: 0.5 MG/DL (ref 0.2–1)
BUN SERPL-MCNC: 11 MG/DL (ref 7–18)
CALCIUM SERPL-MCNC: 8.8 MG/DL (ref 8.5–10.1)
CHLORIDE SERPL-SCNC: 105 MMOL/L (ref 98–107)
CO2 SERPL-SCNC: 29 MMOL/L (ref 21–32)
CREAT SERPL-MCNC: 0.7 MG/DL (ref 0.6–1.3)
EOSINOPHIL NFR BLD AUTO: 4 % (ref 0–6)
GLUCOSE SERPL-MCNC: 163 MG/DL (ref 74–106)
HCT VFR BLD AUTO: 36 % (ref 33–45)
HGB BLD-MCNC: 11.9 G/DL (ref 11.5–14.8)
LYMPHOCYTES NFR BLD AUTO: 1.9 /CMM (ref 0.8–4.8)
LYMPHOCYTES NFR BLD AUTO: 18.7 % (ref 20–44)
MAGNESIUM SERPL-MCNC: 2.1 MG/DL (ref 1.8–2.4)
MCHC RBC AUTO-ENTMCNC: 33 G/DL (ref 31–36)
MCV RBC AUTO: 87 FL (ref 82–100)
MONOCYTES NFR BLD AUTO: 1.4 /CMM (ref 0.1–1.3)
MONOCYTES NFR BLD AUTO: 13.7 % (ref 2–12)
NEUTROPHILS # BLD AUTO: 6.4 /CMM (ref 1.8–8.9)
NEUTROPHILS NFR BLD AUTO: 63 % (ref 43–81)
PHOSPHATE SERPL-MCNC: 4.6 MG/DL (ref 2.5–4.9)
PLATELET # BLD AUTO: 317 /CMM (ref 150–450)
POTASSIUM SERPL-SCNC: 3.8 MMOL/L (ref 3.5–5.1)
RBC # BLD AUTO: 4.19 MIL/UL (ref 4–5.2)
SODIUM SERPL-SCNC: 143 MMOL/L (ref 136–145)
WBC NRBC COR # BLD AUTO: 10.2 K/UL (ref 4.3–11)

## 2021-04-23 PROCEDURE — 0B9P40Z DRAINAGE OF LEFT PLEURA WITH DRAINAGE DEVICE, PERCUTANEOUS ENDOSCOPIC APPROACH: ICD-10-PCS

## 2021-04-23 PROCEDURE — 0BBP4ZX EXCISION OF LEFT PLEURA, PERCUTANEOUS ENDOSCOPIC APPROACH, DIAGNOSTIC: ICD-10-PCS

## 2021-04-23 RX ADMIN — DEXTROSE MONOHYDRATE SCH MLS/HR: 50 INJECTION, SOLUTION INTRAVENOUS at 19:50

## 2021-04-23 RX ADMIN — PIPERACILLIN SODIUM AND TAZOBACTAM SODIUM SCH MLS/HR: .375; 3 INJECTION, POWDER, LYOPHILIZED, FOR SOLUTION INTRAVENOUS at 02:14

## 2021-04-23 RX ADMIN — DEXTROSE MONOHYDRATE SCH MLS/HR: 50 INJECTION, SOLUTION INTRAVENOUS at 08:20

## 2021-04-23 RX ADMIN — PANTOPRAZOLE SODIUM SCH MG: 40 TABLET, DELAYED RELEASE ORAL at 07:30

## 2021-04-23 RX ADMIN — PIPERACILLIN SODIUM AND TAZOBACTAM SODIUM SCH MLS/HR: .375; 3 INJECTION, POWDER, LYOPHILIZED, FOR SOLUTION INTRAVENOUS at 10:20

## 2021-04-23 RX ADMIN — ENOXAPARIN SODIUM SCH MG: 40 INJECTION SUBCUTANEOUS at 06:00

## 2021-04-23 RX ADMIN — PIPERACILLIN SODIUM AND TAZOBACTAM SODIUM SCH MLS/HR: .375; 3 INJECTION, POWDER, LYOPHILIZED, FOR SOLUTION INTRAVENOUS at 17:49

## 2021-04-23 NOTE — NUR
TELE RN OPENING NOTE



PATIENT IS IN BED RESTING.PATIENT IS IN NO DISTRESS. PATIENT IS ON ROOM AIR, NO SOB NOTED.  
PATIENT IS NPO DUE TO SURGERY. PATIENT IS ON TELE MONITOR. SAFETY PRECAUTIONS ARE ON, BED IS 
LOCKED IN THE LOWEST POSITION, SIDE RAILS ARE UP. CALL LIGHT WITHIN REACH. ENDORSE PATIENT 
TO NIGHT SHIFT NURSE FOR JORGE.

-------------------------------------------------------------------------------

Addendum: 04/23/21 at 1929 by PATRICIA GRIFFIN RN

-------------------------------------------------------------------------------

TELE RN CLOSING NOTE



PATIENT IS IN BED RESTING.PATIENT IS IN NO DISTRESS. PATIENT IS ON ROOM AIR, NO SOB NOTED.  
PATIENT IS NPO DUE TO SURGERY. PATIENT IS ON TELE MONITOR. SAFETY PRECAUTIONS ARE ON, BED IS 
LOCKED IN THE LOWEST POSITION, SIDE RAILS ARE UP. CALL LIGHT WITHIN REACH. ENDORSE PATIENT 
TO NIGHT SHIFT NURSE FOR JORGE.

## 2021-04-23 NOTE — NUR
MS RN OPENING NOTE



PATIENT IS IN BED RESTING.PATIENT IS IN NO DISTRESS. PATIENT IS ON ROOM AIR, NO SOB NOTED.  
PATIENT IS NPO DUE TO SURGERY. SAFETY PRECAUTIONS ARE ON, BED IS LOCKED IN THE LOWEST 
POSITION, SIDE RAILS ARE UP. CALL LIGHT WITHIN REACH. WILL CONTINUE TO MONITOR CLOSELY 
THROUGHOUT THE SHIFT.

## 2021-04-23 NOTE — NUR
TELE RN NOTE



PATIENT CAME BACK FROM SURGERY, VITALS ARE WITHIN NORMAL LIMITS, PATIENT IS ON 2L OXYGEN FOR 
COMFORT. PATIENT IS PLACED ON REGULAR DIET, RESUME PRE OPERATIVE ACTIVITIES AS TOLERATED.

## 2021-04-23 NOTE — NUR
MS RN CLOSING NOTES



PATIENT IN BED. A/OX4. ABLE TO MAKE NEEDS KNOWN. ISOLATION IN PLACE. NO S/S OF DISTRESS. NO 
C/O PAIN. ALL NEEDS ATTENDED. ALL SCHEDULED MEDS ADMINISTERED. PATIENT SCHEDULED FOR SURGERY 
TODAY AT 1330. NPO SINCE MIDNIGHT. CONSENTS SIGNED AND FLAGGED IN THE CHART. CHECKLIST 
FLAGGED IN THE CHART. SAFETY KEPT IN PLACE THE WHOLE SHIFT. BED IN LOWEST, LOCKED POSITION, 
CALL LIGHT WITHIN REACH. WILL ENDORSE CARE TO MORNING SHIFT NURSE.

## 2021-04-23 NOTE — NUR
RN NOTES PM SHIFT

RECEIVED PATIENT IN BED, ALERT AND ORIENTED X4, ROOM AIR, NO SOB, HOB ELEVATED, RIGHT 
ANTERIOR LOBE WITH DIMINISHED BREATH SOUNDS, LEFT ANTERIOR UPPER LOBE CLEAR BREATH SOUNDS, 
S/P PLEURX PLACEMENT, DRESSING DRY AND INTACT. ON AIRBORNE ISOLATION, KEPT SAFE, CALL LIGHT 
WITHIN REACH.

## 2021-04-24 VITALS — SYSTOLIC BLOOD PRESSURE: 126 MMHG | DIASTOLIC BLOOD PRESSURE: 69 MMHG

## 2021-04-24 VITALS — DIASTOLIC BLOOD PRESSURE: 57 MMHG | SYSTOLIC BLOOD PRESSURE: 112 MMHG

## 2021-04-24 VITALS — SYSTOLIC BLOOD PRESSURE: 130 MMHG | DIASTOLIC BLOOD PRESSURE: 73 MMHG

## 2021-04-24 VITALS — SYSTOLIC BLOOD PRESSURE: 141 MMHG | DIASTOLIC BLOOD PRESSURE: 76 MMHG

## 2021-04-24 LAB
BASOPHILS # BLD AUTO: 0.1 /CMM (ref 0–0.2)
BASOPHILS NFR BLD AUTO: 0.4 % (ref 0–2)
BUN SERPL-MCNC: 13 MG/DL (ref 7–18)
CALCIUM SERPL-MCNC: 8.2 MG/DL (ref 8.5–10.1)
CHLORIDE SERPL-SCNC: 101 MMOL/L (ref 98–107)
CO2 SERPL-SCNC: 26 MMOL/L (ref 21–32)
CREAT SERPL-MCNC: 1 MG/DL (ref 0.6–1.3)
EOSINOPHIL NFR BLD AUTO: 0.4 % (ref 0–6)
GLUCOSE SERPL-MCNC: 274 MG/DL (ref 74–106)
HCT VFR BLD AUTO: 33 % (ref 33–45)
HGB BLD-MCNC: 10.8 G/DL (ref 11.5–14.8)
LYMPHOCYTES NFR BLD AUTO: 11.3 % (ref 20–44)
LYMPHOCYTES NFR BLD AUTO: 2 /CMM (ref 0.8–4.8)
MAGNESIUM SERPL-MCNC: 2.2 MG/DL (ref 1.8–2.4)
MCHC RBC AUTO-ENTMCNC: 33 G/DL (ref 31–36)
MCV RBC AUTO: 86 FL (ref 82–100)
MONOCYTES NFR BLD AUTO: 12 % (ref 2–12)
MONOCYTES NFR BLD AUTO: 2.1 /CMM (ref 0.1–1.3)
NEUTROPHILS # BLD AUTO: 13.4 /CMM (ref 1.8–8.9)
NEUTROPHILS NFR BLD AUTO: 75.9 % (ref 43–81)
PHOSPHATE SERPL-MCNC: 3.4 MG/DL (ref 2.5–4.9)
PLATELET # BLD AUTO: 316 /CMM (ref 150–450)
POTASSIUM SERPL-SCNC: 3.9 MMOL/L (ref 3.5–5.1)
RBC # BLD AUTO: 3.82 MIL/UL (ref 4–5.2)
SODIUM SERPL-SCNC: 137 MMOL/L (ref 136–145)
WBC NRBC COR # BLD AUTO: 17.7 K/UL (ref 4.3–11)

## 2021-04-24 PROCEDURE — 02HV33Z INSERTION OF INFUSION DEVICE INTO SUPERIOR VENA CAVA, PERCUTANEOUS APPROACH: ICD-10-PCS | Performed by: NURSE PRACTITIONER

## 2021-04-24 PROCEDURE — B548ZZA ULTRASONOGRAPHY OF SUPERIOR VENA CAVA, GUIDANCE: ICD-10-PCS | Performed by: NURSE PRACTITIONER

## 2021-04-24 RX ADMIN — INSULIN HUMAN PRN UNITS: 100 INJECTION, SOLUTION PARENTERAL at 11:59

## 2021-04-24 RX ADMIN — Medication SCH EACH: at 22:06

## 2021-04-24 RX ADMIN — Medication SCH EACH: at 00:21

## 2021-04-24 RX ADMIN — INSULIN HUMAN PRN UNITS: 100 INJECTION, SOLUTION PARENTERAL at 00:22

## 2021-04-24 RX ADMIN — Medication SCH EACH: at 06:45

## 2021-04-24 RX ADMIN — INSULIN HUMAN PRN UNITS: 100 INJECTION, SOLUTION PARENTERAL at 17:14

## 2021-04-24 RX ADMIN — Medication SCH EACH: at 11:52

## 2021-04-24 RX ADMIN — PIPERACILLIN SODIUM AND TAZOBACTAM SODIUM SCH MLS/HR: .375; 3 INJECTION, POWDER, LYOPHILIZED, FOR SOLUTION INTRAVENOUS at 02:08

## 2021-04-24 RX ADMIN — PIPERACILLIN SODIUM AND TAZOBACTAM SODIUM SCH MLS/HR: .375; 3 INJECTION, POWDER, LYOPHILIZED, FOR SOLUTION INTRAVENOUS at 17:16

## 2021-04-24 RX ADMIN — INSULIN HUMAN PRN UNITS: 100 INJECTION, SOLUTION PARENTERAL at 22:10

## 2021-04-24 RX ADMIN — DEXTROSE MONOHYDRATE SCH MLS/HR: 50 INJECTION, SOLUTION INTRAVENOUS at 14:34

## 2021-04-24 RX ADMIN — SODIUM CHLORIDE PRN MLS/HR: 4.5 INJECTION, SOLUTION INTRAVENOUS at 14:43

## 2021-04-24 RX ADMIN — Medication SCH EACH: at 17:12

## 2021-04-24 RX ADMIN — METFORMIN HYDROCHLORIDE SCH MG: 500 TABLET, FILM COATED ORAL at 16:37

## 2021-04-24 RX ADMIN — PANTOPRAZOLE SODIUM SCH MG: 40 TABLET, DELAYED RELEASE ORAL at 08:36

## 2021-04-24 RX ADMIN — PIPERACILLIN SODIUM AND TAZOBACTAM SODIUM SCH MLS/HR: .375; 3 INJECTION, POWDER, LYOPHILIZED, FOR SOLUTION INTRAVENOUS at 11:02

## 2021-04-24 RX ADMIN — INSULIN GLARGINE SCH UNIT: 100 INJECTION, SOLUTION SUBCUTANEOUS at 22:08

## 2021-04-24 RX ADMIN — INSULIN HUMAN PRN UNITS: 100 INJECTION, SOLUTION PARENTERAL at 06:44

## 2021-04-24 NOTE — NUR
RN NOTES PM SHIFT

ALERT AND ORIENTED X4, 2LPM VIA NC PRN, STABLE ON ROOM AIR, NO DYSPNEA NOTED, S/P PLEURX 
CATHETER PLACEMENT TO LEFT LOWER LOBE OF CHEST, DRESSING DRY AND INTACT, NO DRAINAGE, 
ACTIVELY COUGHING BLOOD, FOR AFB SMEAR #3, NEW ORDER OF ACCUCHECK ACHS, VANCOMYCIN AND ZOSYN 
IV, WILL NEED PICC LINE PLACEMENT, IV LINE ARE BECOMING UNSTABLE, FOR ID AND ONCOLOGY 
CONSULT.

## 2021-04-24 NOTE — NUR
RN NOTES





PATIENT CURRENTLY LYING IN BED, RESTING. A/O X4. AIRBORNE AND CONTACT ISOLATION IMPLEMENTED. 
UNDERSTANDS ENGLISH. NO DISTRESS OR PAIN NOTED. NO SHORTNESS OF BREATH NOTED. PATIENT IS 
AMBULATORY. IV ACCESS TO RIGHT UPPER ARM - MIDLINE #18 - CURRENTLY RUNNING ZOSYN 3.375G IV @ 
25ML/HR. PATIENT, OCCASIONAL COUGHING. PATIENT ON ROOM AIR - NO SHORTNESS OF BREATH NOTED. 
TELE MONITOR READS SINUS RHYTHM. PRELIMINARY AFB SMEAR IS NEGATIVE. SAFETY PRECAUTIONS 
IMPLEMENTED. CALL LIGHT WITHIN REACH. WILL CONTINUE TO MONITOR.

## 2021-04-24 NOTE — NUR
TELE RN CLOSING NOTE



PATIENT CURRENTLY LYING IN BED, RESTING. A/O X4. AIRBORNE AND CONTACT ISOLATION IMPLEMENTED. 
UNDERSTANDS ENGLISH. NO DISTRESS OR PAIN NOTED. NO SHORTNESS OF BREATH NOTED. PATIENT IS 
AMBULATORY. IV ACCESS TO RIGHT UPPER ARM - MIDLINE #18 - CURRENTLY RUNNING ZOSYN 3.375G IV @ 
25ML/HR. PATIENT STILL HAS HEMOPTYSIS, OCCASIONAL COUGHING. PATIENT ON ROOM AIR - NO 
SHORTNESS OF BREATH NOTED. TELE MONITOR READS SINUS RHYTHM. PRELIMINARY AF[employer] SMEAR IS 
NEGATIVE. SAFETY PRECAUTIONS IMPLEMENTED. CALL LIGHT WITHIN REACH. WILL ENDORSE TO NIGHT 
SHIFT NURSE FOR JORGE.

## 2021-04-24 NOTE — NUR
TELE RN OPENING NOTE



RECEIVED PATIENT LYING IN BED, RESTING. NO DISTRESS OR PAIN NOTED. NO SOB NOTED. PATIENT ON 
ROOM AIR. NO SOB NOTED. PATIENT ON AIRBORNE ISOLATION. TELE MONITOR READS SINUS RHYTHM. 
SAFETY PRECAUTIONS IMPLEMENTED, BED IN LOCKED AND LOWEST POSITION, SIDE RAILS X2. CALL LIGHT 
WITHIN REACH. WILL CONTINUE TO MONITOR.

## 2021-04-25 VITALS — DIASTOLIC BLOOD PRESSURE: 68 MMHG | SYSTOLIC BLOOD PRESSURE: 137 MMHG

## 2021-04-25 VITALS — DIASTOLIC BLOOD PRESSURE: 69 MMHG | SYSTOLIC BLOOD PRESSURE: 144 MMHG

## 2021-04-25 VITALS — DIASTOLIC BLOOD PRESSURE: 84 MMHG | SYSTOLIC BLOOD PRESSURE: 126 MMHG

## 2021-04-25 VITALS — SYSTOLIC BLOOD PRESSURE: 145 MMHG | DIASTOLIC BLOOD PRESSURE: 77 MMHG

## 2021-04-25 VITALS — DIASTOLIC BLOOD PRESSURE: 83 MMHG | SYSTOLIC BLOOD PRESSURE: 144 MMHG

## 2021-04-25 VITALS — SYSTOLIC BLOOD PRESSURE: 144 MMHG | DIASTOLIC BLOOD PRESSURE: 69 MMHG

## 2021-04-25 LAB
ALBUMIN SERPL BCP-MCNC: 2.2 G/DL (ref 3.4–5)
ALP SERPL-CCNC: 56 U/L (ref 46–116)
ALT SERPL W P-5'-P-CCNC: 21 U/L (ref 12–78)
AST SERPL W P-5'-P-CCNC: 35 U/L (ref 15–37)
BASOPHILS # BLD AUTO: 0 /CMM (ref 0–0.2)
BASOPHILS NFR BLD AUTO: 0.2 % (ref 0–2)
BILIRUB SERPL-MCNC: 0.6 MG/DL (ref 0.2–1)
BUN SERPL-MCNC: 9 MG/DL (ref 7–18)
CALCIUM SERPL-MCNC: 8.3 MG/DL (ref 8.5–10.1)
CHLORIDE SERPL-SCNC: 104 MMOL/L (ref 98–107)
CO2 SERPL-SCNC: 26 MMOL/L (ref 21–32)
CREAT SERPL-MCNC: 0.8 MG/DL (ref 0.6–1.3)
EOSINOPHIL NFR BLD AUTO: 1 % (ref 0–6)
GLUCOSE SERPL-MCNC: 193 MG/DL (ref 74–106)
HCT VFR BLD AUTO: 32 % (ref 33–45)
HGB BLD-MCNC: 10.6 G/DL (ref 11.5–14.8)
LYMPHOCYTES NFR BLD AUTO: 1.7 /CMM (ref 0.8–4.8)
LYMPHOCYTES NFR BLD AUTO: 12.1 % (ref 20–44)
MAGNESIUM SERPL-MCNC: 2 MG/DL (ref 1.8–2.4)
MCHC RBC AUTO-ENTMCNC: 33 G/DL (ref 31–36)
MCV RBC AUTO: 86 FL (ref 82–100)
MONOCYTES NFR BLD AUTO: 1.7 /CMM (ref 0.1–1.3)
MONOCYTES NFR BLD AUTO: 12.2 % (ref 2–12)
NEUTROPHILS # BLD AUTO: 10.6 /CMM (ref 1.8–8.9)
NEUTROPHILS NFR BLD AUTO: 74.5 % (ref 43–81)
PHOSPHATE SERPL-MCNC: 3 MG/DL (ref 2.5–4.9)
PLATELET # BLD AUTO: 307 /CMM (ref 150–450)
POTASSIUM SERPL-SCNC: 3.6 MMOL/L (ref 3.5–5.1)
PROT SERPL-MCNC: 6.8 G/DL (ref 6.4–8.2)
RBC # BLD AUTO: 3.7 MIL/UL (ref 4–5.2)
SODIUM SERPL-SCNC: 138 MMOL/L (ref 136–145)
WBC NRBC COR # BLD AUTO: 14.2 K/UL (ref 4.3–11)

## 2021-04-25 RX ADMIN — SODIUM CHLORIDE PRN MLS/HR: 4.5 INJECTION, SOLUTION INTRAVENOUS at 02:43

## 2021-04-25 RX ADMIN — INSULIN HUMAN PRN UNITS: 100 INJECTION, SOLUTION PARENTERAL at 12:44

## 2021-04-25 RX ADMIN — PIPERACILLIN SODIUM AND TAZOBACTAM SODIUM SCH MLS/HR: .375; 3 INJECTION, POWDER, LYOPHILIZED, FOR SOLUTION INTRAVENOUS at 17:59

## 2021-04-25 RX ADMIN — INSULIN GLARGINE SCH UNIT: 100 INJECTION, SOLUTION SUBCUTANEOUS at 22:18

## 2021-04-25 RX ADMIN — INSULIN HUMAN PRN UNITS: 100 INJECTION, SOLUTION PARENTERAL at 06:49

## 2021-04-25 RX ADMIN — Medication SCH EACH: at 22:16

## 2021-04-25 RX ADMIN — INSULIN HUMAN PRN UNITS: 100 INJECTION, SOLUTION PARENTERAL at 18:02

## 2021-04-25 RX ADMIN — METFORMIN HYDROCHLORIDE SCH MG: 500 TABLET, FILM COATED ORAL at 10:02

## 2021-04-25 RX ADMIN — Medication SCH EACH: at 06:47

## 2021-04-25 RX ADMIN — DEXTROSE MONOHYDRATE SCH MLS/HR: 50 INJECTION, SOLUTION INTRAVENOUS at 14:16

## 2021-04-25 RX ADMIN — DEXTROSE MONOHYDRATE SCH MLS/HR: 50 INJECTION, SOLUTION INTRAVENOUS at 01:13

## 2021-04-25 RX ADMIN — PANTOPRAZOLE SODIUM SCH MG: 40 TABLET, DELAYED RELEASE ORAL at 08:22

## 2021-04-25 RX ADMIN — PIPERACILLIN SODIUM AND TAZOBACTAM SODIUM SCH MLS/HR: .375; 3 INJECTION, POWDER, LYOPHILIZED, FOR SOLUTION INTRAVENOUS at 02:13

## 2021-04-25 RX ADMIN — AMLODIPINE BESYLATE SCH MG: 5 TABLET ORAL at 10:03

## 2021-04-25 RX ADMIN — CLOPIDOGREL BISULFATE SCH MG: 75 TABLET, FILM COATED ORAL at 10:02

## 2021-04-25 RX ADMIN — INSULIN HUMAN PRN UNITS: 100 INJECTION, SOLUTION PARENTERAL at 22:17

## 2021-04-25 RX ADMIN — TAMOXIFEN CITRATE SCH MG: 10 TABLET, FILM COATED ORAL at 10:04

## 2021-04-25 RX ADMIN — Medication SCH EACH: at 12:43

## 2021-04-25 RX ADMIN — LOSARTAN POTASSIUM SCH MG: 50 TABLET, FILM COATED ORAL at 10:03

## 2021-04-25 RX ADMIN — Medication SCH EACH: at 18:00

## 2021-04-25 RX ADMIN — PIPERACILLIN SODIUM AND TAZOBACTAM SODIUM SCH MLS/HR: .375; 3 INJECTION, POWDER, LYOPHILIZED, FOR SOLUTION INTRAVENOUS at 10:05

## 2021-04-25 RX ADMIN — LINAGLIPTIN SCH MG: 5 TABLET, FILM COATED ORAL at 10:02

## 2021-04-25 RX ADMIN — METFORMIN HYDROCHLORIDE SCH MG: 500 TABLET, FILM COATED ORAL at 18:00

## 2021-04-25 NOTE — NUR
TELE RN OPENING NOTE 



PATIENT A/OX4; ABLE TO MAKE NEEDS KNOWN. ON ROOM AIR; TOLERATING WELL WITH NO SOB. DENIES 
PAIN OR DISCOMFORT AT THIS TIME. EXTERNAL CARDIAC MONITOR READS SINUS TACHY 'S. [place] 
MIDLINE #18G; PATENT AND INTACT. PLEURX TO LEFT ABDOMEN; DRESSING KEPT C/D/I. SAFETY 
MEASURES IN PLACE: BED IN LOWEST LOCKED POSITION, SIDE RAILS UP X2, CALL LIGHT WITHIN EASY 
REACH. WILL CONTINUE PLAN OF CARE.

## 2021-04-25 NOTE — NUR
RN NOTES



PATIENT CURRENTLY LYING IN BED, RESTING. A/O X4. AIRBORNE AND CONTACT ISOLATION IMPLEMENTED. 
UNDERSTANDS ENGLISH. NO DISTRESS OR PAIN NOTED. NO SHORTNESS OF BREATH NOTED. PATIENT IS 
AMBULATORY. IV ACCESS TO RIGHT UPPER ARM - MIDLINE #18 - CURRENTLY RUNNING ZOSYN 3.375G IV @ 
25ML/HR. PATIENT, OCCASIONAL COUGHING. PATIENT ON ROOM AIR - NO SHORTNESS OF BREATH NOTED. 
TELE MONITOR READS SINUS RHYTHM. SAFETY PRECAUTIONS IMPLEMENTED. CALL LIGHT WITHIN REACH. 
WILL ENDORSE CARE TO DAY SHIFT NURSE..

## 2021-04-25 NOTE — NUR
MS RN NOTES

PATIENT IN BED ALERT ORIENTED X 4.  NO ACUTE DISTRESS NOTED. BREATHING UNLABORED. NO SOB 
NOTED. IV ACCESS PATENT AND INTACT. NEEDS ATTENDED AND ANTICIPATED. SAFETY MEASURES IN 
PLACE. CALL LIGHT WITHIN REACH. WILL ENDORSE TO NIGHT NURSE FOR CONTINUITY OF CARE.

## 2021-04-25 NOTE — NUR
MS RN NOTES

PATIENT IN BED ALERT ORIENTED X 4.  NO ACUTE DISTRESS NOTED. BREATHING UNLABORED. NO SOB 
NOTED. IV ACCESS PATENT AND INTACT. SAFETY MEASURES IN PLACE. CALL LIGHT WITHIN REACH. WILL 
CONTINUE TO MONITOR ACCORDINGLY

## 2021-04-26 VITALS — DIASTOLIC BLOOD PRESSURE: 76 MMHG | SYSTOLIC BLOOD PRESSURE: 158 MMHG

## 2021-04-26 VITALS — DIASTOLIC BLOOD PRESSURE: 60 MMHG | SYSTOLIC BLOOD PRESSURE: 129 MMHG

## 2021-04-26 VITALS — SYSTOLIC BLOOD PRESSURE: 148 MMHG | DIASTOLIC BLOOD PRESSURE: 64 MMHG

## 2021-04-26 VITALS — DIASTOLIC BLOOD PRESSURE: 76 MMHG | SYSTOLIC BLOOD PRESSURE: 134 MMHG

## 2021-04-26 VITALS — SYSTOLIC BLOOD PRESSURE: 126 MMHG | DIASTOLIC BLOOD PRESSURE: 65 MMHG

## 2021-04-26 VITALS — DIASTOLIC BLOOD PRESSURE: 64 MMHG | SYSTOLIC BLOOD PRESSURE: 143 MMHG

## 2021-04-26 LAB
BASOPHILS # BLD AUTO: 0.1 /CMM (ref 0–0.2)
BASOPHILS NFR BLD AUTO: 0.5 % (ref 0–2)
BUN SERPL-MCNC: 13 MG/DL (ref 7–18)
CALCIUM SERPL-MCNC: 8.8 MG/DL (ref 8.5–10.1)
CHLORIDE SERPL-SCNC: 105 MMOL/L (ref 98–107)
CO2 SERPL-SCNC: 28 MMOL/L (ref 21–32)
CREAT SERPL-MCNC: 0.9 MG/DL (ref 0.6–1.3)
EOSINOPHIL NFR BLD AUTO: 1.7 % (ref 0–6)
GLUCOSE SERPL-MCNC: 165 MG/DL (ref 74–106)
HCT VFR BLD AUTO: 33 % (ref 33–45)
HGB BLD-MCNC: 10.5 G/DL (ref 11.5–14.8)
LYMPHOCYTES NFR BLD AUTO: 1.2 /CMM (ref 0.8–4.8)
LYMPHOCYTES NFR BLD AUTO: 8.8 % (ref 20–44)
MAGNESIUM SERPL-MCNC: 2.3 MG/DL (ref 1.8–2.4)
MCHC RBC AUTO-ENTMCNC: 32 G/DL (ref 31–36)
MCV RBC AUTO: 88 FL (ref 82–100)
MONOCYTES NFR BLD AUTO: 1.8 /CMM (ref 0.1–1.3)
MONOCYTES NFR BLD AUTO: 12.6 % (ref 2–12)
NEUTROPHILS # BLD AUTO: 10.8 /CMM (ref 1.8–8.9)
NEUTROPHILS NFR BLD AUTO: 76.4 % (ref 43–81)
PHOSPHATE SERPL-MCNC: 3.7 MG/DL (ref 2.5–4.9)
PLATELET # BLD AUTO: 299 /CMM (ref 150–450)
POTASSIUM SERPL-SCNC: 4.1 MMOL/L (ref 3.5–5.1)
RBC # BLD AUTO: 3.75 MIL/UL (ref 4–5.2)
SODIUM SERPL-SCNC: 140 MMOL/L (ref 136–145)
WBC NRBC COR # BLD AUTO: 14.1 K/UL (ref 4.3–11)

## 2021-04-26 RX ADMIN — INSULIN HUMAN PRN UNITS: 100 INJECTION, SOLUTION PARENTERAL at 11:27

## 2021-04-26 RX ADMIN — LOSARTAN POTASSIUM SCH MG: 50 TABLET, FILM COATED ORAL at 09:35

## 2021-04-26 RX ADMIN — INSULIN HUMAN PRN UNITS: 100 INJECTION, SOLUTION PARENTERAL at 17:01

## 2021-04-26 RX ADMIN — INSULIN HUMAN PRN UNITS: 100 INJECTION, SOLUTION PARENTERAL at 22:07

## 2021-04-26 RX ADMIN — Medication SCH EACH: at 22:04

## 2021-04-26 RX ADMIN — METFORMIN HYDROCHLORIDE SCH MG: 500 TABLET, FILM COATED ORAL at 09:30

## 2021-04-26 RX ADMIN — CLOPIDOGREL BISULFATE SCH MG: 75 TABLET, FILM COATED ORAL at 09:30

## 2021-04-26 RX ADMIN — DEXTROSE MONOHYDRATE SCH MLS/HR: 50 INJECTION, SOLUTION INTRAVENOUS at 01:20

## 2021-04-26 RX ADMIN — PIPERACILLIN SODIUM AND TAZOBACTAM SODIUM SCH MLS/HR: .375; 3 INJECTION, POWDER, LYOPHILIZED, FOR SOLUTION INTRAVENOUS at 10:28

## 2021-04-26 RX ADMIN — AMLODIPINE BESYLATE SCH MG: 5 TABLET ORAL at 09:35

## 2021-04-26 RX ADMIN — PIPERACILLIN SODIUM AND TAZOBACTAM SODIUM SCH MLS/HR: .375; 3 INJECTION, POWDER, LYOPHILIZED, FOR SOLUTION INTRAVENOUS at 02:15

## 2021-04-26 RX ADMIN — Medication SCH EACH: at 07:46

## 2021-04-26 RX ADMIN — Medication SCH EACH: at 12:12

## 2021-04-26 RX ADMIN — METFORMIN HYDROCHLORIDE SCH MG: 500 TABLET, FILM COATED ORAL at 16:27

## 2021-04-26 RX ADMIN — TAMOXIFEN CITRATE SCH MG: 10 TABLET, FILM COATED ORAL at 09:35

## 2021-04-26 RX ADMIN — Medication SCH EACH: at 17:05

## 2021-04-26 RX ADMIN — LINAGLIPTIN SCH MG: 5 TABLET, FILM COATED ORAL at 09:30

## 2021-04-26 RX ADMIN — INSULIN HUMAN PRN UNITS: 100 INJECTION, SOLUTION PARENTERAL at 06:14

## 2021-04-26 RX ADMIN — INSULIN GLARGINE SCH UNIT: 100 INJECTION, SOLUTION SUBCUTANEOUS at 22:06

## 2021-04-26 RX ADMIN — Medication SCH ML: at 17:14

## 2021-04-26 RX ADMIN — PANTOPRAZOLE SODIUM SCH MG: 40 TABLET, DELAYED RELEASE ORAL at 08:31

## 2021-04-26 NOTE — NUR
MS RN OPENING NOTE



PATIENT IN BED AWAKE, RESTING. PATIENT A/O X 4. ABLE TO MAKE NEEDS KNOWN. TOLERATING ROOM 
AIR. NO S/S OF RESPIRATORY DISTRESS. BREATHING EVEN AND UNLABORED. PLEURX CLEAN AND DRY. 
ISOLATION PRECAUTIONS STILL IN PLACE. NO N/V/PAIN OR DISCOMFORT AT THIS TIME. [place] MIDLINE 
INTACT AND PATENT. SAFETY MEASURES IN PLACE: BED IN THE LOWEST AND LOCKED POSITION, CALL 
LIGHT WITHIN REACH, SIDE RAILS UP. WILL MONITOR PATIENT CLOSELY.

## 2021-04-26 NOTE — NUR
TELE RN CLOSING NOTE 



PATIENT A/OX4; ABLE TO MAKE NEEDS KNOWN. ON ROOM AIR; TOLERATING WELL WITH NO SOB. DENIES 
PAIN OR DISCOMFORT AT THIS TIME. EXTERNAL CARDIAC MONITOR READS SINUS TACHY 'S. [place] 
MIDLINE #18G; INFUSING  NS @75ML/HR;  PATENT AND INTACT. PLEURX TO LEFT ABDOMEN; DRESSING 
KEPT C/D/I. SAFETY MEASURES IN PLACE: BED IN LOWEST LOCKED POSITION, SIDE RAILS UP X2, CALL 
LIGHT WITHIN EASY REACH. WILL ENDORSE PLAN OF CARE TO ONCOMING MORNING RN.

## 2021-04-26 NOTE — NUR
MS RN CLOSING NOTES



PATIENT A/OX4; ABLE TO MAKE NEEDS KNOWN. ON ROOM AIR; TOLERATING WELL WITH NO SOB. DENIES 
PAIN OR DISCOMFORT AT THIS TIME. PATIENT IS ABLE TO MAKE NEEDS   KNOWN. [place] MIDLINE #18G; 
PATENT AND INTACT. PLEURX TO LEFT ABDOMEN; DRESSING KEPT C/D/I. SAFETY MEASURES IN PLACE. 
PATIENT WAS ON AIRBORNE ISOLATION PRECAUTION THROUGHOUT SHIFT. ALL MEDICATION GIVEN AND 
NEEDS MET. BED IN LOWEST LOCKED POSITION, SIDE RAILS UP X2, CALL LIGHT WITHIN EASY REACH. 
WILL ENDORSE TO NEXT SHIFT.

## 2021-04-26 NOTE — NUR
TELE RN OPENING NOTE 



PATIENT A/OX4; ABLE TO MAKE NEEDS KNOWN. ON ROOM AIR; TOLERATING WELL WITH NO SOB. DENIES 
PAIN OR DISCOMFORT AT THIS TIME. EXTERNAL CARDIAC MONITOR READS SINUS TACHY 'S. [place] 
MIDLINE #18G; PATENT AND INTACT. PLEURX TO LEFT ABDOMEN; DRESSING KEPT C/D/I. SAFETY 
MEASURES IN PLACE. PATIENT IS ON AIRBORNE ISOLATION PRECAUTION WILL MAINTAIN. BED IN LOWEST 
LOCKED POSITION, SIDE RAILS UP X2, CALL LIGHT WITHIN EASY REACH. WILL CONTINUE TO MONITOR

## 2021-04-27 VITALS — DIASTOLIC BLOOD PRESSURE: 61 MMHG | SYSTOLIC BLOOD PRESSURE: 137 MMHG

## 2021-04-27 VITALS — DIASTOLIC BLOOD PRESSURE: 66 MMHG | SYSTOLIC BLOOD PRESSURE: 143 MMHG

## 2021-04-27 VITALS — SYSTOLIC BLOOD PRESSURE: 148 MMHG | DIASTOLIC BLOOD PRESSURE: 83 MMHG

## 2021-04-27 LAB
BASOPHILS # BLD AUTO: 0.1 /CMM (ref 0–0.2)
BASOPHILS NFR BLD AUTO: 0.4 % (ref 0–2)
BUN SERPL-MCNC: 11 MG/DL (ref 7–18)
CALCIUM SERPL-MCNC: 8.6 MG/DL (ref 8.5–10.1)
CHLORIDE SERPL-SCNC: 105 MMOL/L (ref 98–107)
CO2 SERPL-SCNC: 28 MMOL/L (ref 21–32)
CREAT SERPL-MCNC: 0.9 MG/DL (ref 0.6–1.3)
EOSINOPHIL NFR BLD AUTO: 2.5 % (ref 0–6)
GLUCOSE SERPL-MCNC: 170 MG/DL (ref 74–106)
HCT VFR BLD AUTO: 35 % (ref 33–45)
HGB BLD-MCNC: 11.3 G/DL (ref 11.5–14.8)
LYMPHOCYTES NFR BLD AUTO: 13.9 % (ref 20–44)
LYMPHOCYTES NFR BLD AUTO: 2 /CMM (ref 0.8–4.8)
MAGNESIUM SERPL-MCNC: 2.2 MG/DL (ref 1.8–2.4)
MCHC RBC AUTO-ENTMCNC: 33 G/DL (ref 31–36)
MCV RBC AUTO: 87 FL (ref 82–100)
MONOCYTES NFR BLD AUTO: 1.4 /CMM (ref 0.1–1.3)
MONOCYTES NFR BLD AUTO: 10 % (ref 2–12)
NEUTROPHILS # BLD AUTO: 10.4 /CMM (ref 1.8–8.9)
NEUTROPHILS NFR BLD AUTO: 73.2 % (ref 43–81)
PHOSPHATE SERPL-MCNC: 3.9 MG/DL (ref 2.5–4.9)
PLATELET # BLD AUTO: 347 /CMM (ref 150–450)
POTASSIUM SERPL-SCNC: 4.2 MMOL/L (ref 3.5–5.1)
RBC # BLD AUTO: 3.98 MIL/UL (ref 4–5.2)
SODIUM SERPL-SCNC: 143 MMOL/L (ref 136–145)
WBC NRBC COR # BLD AUTO: 14.2 K/UL (ref 4.3–11)

## 2021-04-27 RX ADMIN — Medication SCH ML: at 17:04

## 2021-04-27 RX ADMIN — TAMOXIFEN CITRATE SCH MG: 10 TABLET, FILM COATED ORAL at 08:08

## 2021-04-27 RX ADMIN — LINAGLIPTIN SCH MG: 5 TABLET, FILM COATED ORAL at 08:08

## 2021-04-27 RX ADMIN — CLOPIDOGREL BISULFATE SCH MG: 75 TABLET, FILM COATED ORAL at 08:08

## 2021-04-27 RX ADMIN — Medication SCH EACH: at 11:49

## 2021-04-27 RX ADMIN — INSULIN GLARGINE SCH UNIT: 100 INJECTION, SOLUTION SUBCUTANEOUS at 21:46

## 2021-04-27 RX ADMIN — INSULIN HUMAN PRN UNITS: 100 INJECTION, SOLUTION PARENTERAL at 17:09

## 2021-04-27 RX ADMIN — PANTOPRAZOLE SODIUM SCH MG: 40 TABLET, DELAYED RELEASE ORAL at 08:08

## 2021-04-27 RX ADMIN — METFORMIN HYDROCHLORIDE SCH MG: 500 TABLET, FILM COATED ORAL at 17:04

## 2021-04-27 RX ADMIN — Medication SCH EACH: at 17:09

## 2021-04-27 RX ADMIN — Medication SCH ML: at 08:00

## 2021-04-27 RX ADMIN — AMLODIPINE BESYLATE SCH MG: 5 TABLET ORAL at 08:08

## 2021-04-27 RX ADMIN — Medication SCH EACH: at 08:01

## 2021-04-27 RX ADMIN — INSULIN HUMAN PRN UNITS: 100 INJECTION, SOLUTION PARENTERAL at 08:02

## 2021-04-27 RX ADMIN — Medication SCH EACH: at 21:45

## 2021-04-27 RX ADMIN — VALSARTAN SCH MG: 80 TABLET ORAL at 08:08

## 2021-04-27 RX ADMIN — INSULIN HUMAN PRN UNITS: 100 INJECTION, SOLUTION PARENTERAL at 11:47

## 2021-04-27 RX ADMIN — INSULIN HUMAN PRN UNITS: 100 INJECTION, SOLUTION PARENTERAL at 21:47

## 2021-04-27 RX ADMIN — METFORMIN HYDROCHLORIDE SCH MG: 500 TABLET, FILM COATED ORAL at 08:08

## 2021-04-27 RX ADMIN — Medication SCH ML: at 11:50

## 2021-04-27 NOTE — NUR
m/s lvn: notes



lunch served. hob elevated. isolation precaution maintained. instructed to call for 
assistance. will continue to monitor.

## 2021-04-27 NOTE — NUR
m/s lvn: notes



pt resting comfortable. no c/o sob. Left side back PleurX catheter in place with dressing. 
remains on isolation and maintained. will continue to monitor.

## 2021-04-27 NOTE — NUR
m/s lvn: notes



dinner served. no distress noted. no c/o sob, chest pain, or any discomfort. instructed to 
call for assistance. will continue to monitor.

## 2021-04-27 NOTE — NUR
m/s lvn: notes



pt having breakfast with hob elevated. no c/o sob, chest pain, or any discomfort. instructed 
to call for assistance. will continue to monitor.

-------------------------------------------------------------------------------

Addendum: 04/27/21 at 1745 by ZURI GIBBONS LVN

-------------------------------------------------------------------------------

s/p Left thoracoscopy, pleural biopsy, and insertion of a PleurX catheter on 4/23/21, 
dressing in place. no distress noted.

## 2021-04-27 NOTE — NUR
MS RN CLOSING NOTE



PATIENT IN BED RESTING, NO DISCOMFORT REPORTED AT THIS TIME. ALL NEEDS MET AND ATTENDED. 
TOLERATING ROOM AIR AT 95%. BREATHING EVEN AND UNLABORED. ROUTINE MEDICATIONS AND PRN 
MEDICATIONS GIVEN. ALL ORDERS CARRIED OUT. SAFETY MEASURES MAINTAINED. ENDORSED TO DAY SHIFT 
NURSE FOR JORGE.

## 2021-04-27 NOTE — NUR
MS RN OPENING NOTE



PATIENT IN BED RESTING, EASILY AROUSED. PATIENT DOES NOT COMPLAIN OF ANY PAIN OR DISCOMFORT 
AT THIS TIME. ABLE TO MAKE NEEDS KNOWN. BREATHING EVEN AND UNLABORED. PLEURX CATHETER AND 
DRESSING STILL INTACT, CLEAN AND DRY. SAFETY MEASURES IN PLACE: HOB ELEVATED, SIDE RAILS UP, 
BED IN LOCKED AND LOWEST POSITION. CALL LIGHT WITHIN REACH. WILL MONITOR PATIENT CLOSELY.

## 2021-04-28 VITALS — DIASTOLIC BLOOD PRESSURE: 53 MMHG | SYSTOLIC BLOOD PRESSURE: 116 MMHG

## 2021-04-28 LAB
BASOPHILS # BLD AUTO: 0 /CMM (ref 0–0.2)
BASOPHILS NFR BLD AUTO: 0.3 % (ref 0–2)
BUN SERPL-MCNC: 14 MG/DL (ref 7–18)
CALCIUM SERPL-MCNC: 9.1 MG/DL (ref 8.5–10.1)
CHLORIDE SERPL-SCNC: 106 MMOL/L (ref 98–107)
CO2 SERPL-SCNC: 29 MMOL/L (ref 21–32)
CREAT SERPL-MCNC: 0.9 MG/DL (ref 0.6–1.3)
EOSINOPHIL NFR BLD AUTO: 3.6 % (ref 0–6)
GLUCOSE SERPL-MCNC: 150 MG/DL (ref 74–106)
HCT VFR BLD AUTO: 34 % (ref 33–45)
HGB BLD-MCNC: 11 G/DL (ref 11.5–14.8)
LYMPHOCYTES NFR BLD AUTO: 1.6 /CMM (ref 0.8–4.8)
LYMPHOCYTES NFR BLD AUTO: 12.5 % (ref 20–44)
MAGNESIUM SERPL-MCNC: 2.1 MG/DL (ref 1.8–2.4)
MCHC RBC AUTO-ENTMCNC: 32 G/DL (ref 31–36)
MCV RBC AUTO: 87 FL (ref 82–100)
MONOCYTES NFR BLD AUTO: 1.6 /CMM (ref 0.1–1.3)
MONOCYTES NFR BLD AUTO: 12.1 % (ref 2–12)
NEUTROPHILS # BLD AUTO: 9.2 /CMM (ref 1.8–8.9)
NEUTROPHILS NFR BLD AUTO: 71.5 % (ref 43–81)
PHOSPHATE SERPL-MCNC: 4.5 MG/DL (ref 2.5–4.9)
PLATELET # BLD AUTO: 366 /CMM (ref 150–450)
POTASSIUM SERPL-SCNC: 4.2 MMOL/L (ref 3.5–5.1)
RBC # BLD AUTO: 3.93 MIL/UL (ref 4–5.2)
SODIUM SERPL-SCNC: 144 MMOL/L (ref 136–145)
WBC NRBC COR # BLD AUTO: 12.8 K/UL (ref 4.3–11)

## 2021-04-28 RX ADMIN — PANTOPRAZOLE SODIUM SCH MG: 40 TABLET, DELAYED RELEASE ORAL at 09:55

## 2021-04-28 RX ADMIN — Medication SCH EACH: at 17:37

## 2021-04-28 RX ADMIN — Medication SCH ML: at 12:30

## 2021-04-28 RX ADMIN — INSULIN HUMAN PRN UNITS: 100 INJECTION, SOLUTION PARENTERAL at 13:33

## 2021-04-28 RX ADMIN — Medication PRN MG: at 12:46

## 2021-04-28 RX ADMIN — TAMOXIFEN CITRATE SCH MG: 10 TABLET, FILM COATED ORAL at 09:58

## 2021-04-28 RX ADMIN — LINAGLIPTIN SCH MG: 5 TABLET, FILM COATED ORAL at 09:55

## 2021-04-28 RX ADMIN — METFORMIN HYDROCHLORIDE SCH MG: 500 TABLET, FILM COATED ORAL at 17:37

## 2021-04-28 RX ADMIN — Medication SCH ML: at 17:37

## 2021-04-28 RX ADMIN — AMLODIPINE BESYLATE SCH MG: 5 TABLET ORAL at 09:56

## 2021-04-28 RX ADMIN — Medication SCH EACH: at 12:30

## 2021-04-28 RX ADMIN — INSULIN HUMAN PRN UNITS: 100 INJECTION, SOLUTION PARENTERAL at 17:36

## 2021-04-28 RX ADMIN — INSULIN HUMAN PRN UNITS: 100 INJECTION, SOLUTION PARENTERAL at 06:48

## 2021-04-28 RX ADMIN — Medication SCH EACH: at 06:49

## 2021-04-28 RX ADMIN — METFORMIN HYDROCHLORIDE SCH MG: 500 TABLET, FILM COATED ORAL at 09:55

## 2021-04-28 RX ADMIN — ALBUTEROL SULFATE PRN MG: 2.5 SOLUTION RESPIRATORY (INHALATION) at 12:46

## 2021-04-28 RX ADMIN — Medication SCH ML: at 08:00

## 2021-04-28 RX ADMIN — INSULIN GLARGINE SCH UNIT: 100 INJECTION, SOLUTION SUBCUTANEOUS at 22:01

## 2021-04-28 RX ADMIN — Medication PRN MG: at 19:51

## 2021-04-28 RX ADMIN — VALSARTAN SCH MG: 80 TABLET ORAL at 09:54

## 2021-04-28 RX ADMIN — CLOPIDOGREL BISULFATE SCH MG: 75 TABLET, FILM COATED ORAL at 09:55

## 2021-04-28 RX ADMIN — Medication SCH EACH: at 22:28

## 2021-04-28 RX ADMIN — ALBUTEROL SULFATE PRN MG: 2.5 SOLUTION RESPIRATORY (INHALATION) at 19:51

## 2021-04-28 RX ADMIN — INSULIN HUMAN PRN UNITS: 100 INJECTION, SOLUTION PARENTERAL at 22:20

## 2021-04-28 NOTE — NUR
MS RN OPENING NOTE



PATIENT AWAKE IN BED, ALERT AND ORIENTED X 4. NO ACUTE DISTRESS NOTED. PATIENT ON 4L OF 
OXYGEN VIA NASAL CANNULA DUE TO PATIENT REPORTING SHORTNESS OF BREATH, SPO2 %. 
WHEEZING HEARD THROUGHOUT LUNGS BILATERALLY ON AUSCULTATION. NO REPORTS OF PAIN AT THIS 
TIME. RIGHT UPPER ARM MIDLINE #18G CLEAN, DRY AND INTACT. PLEURX ON LEFT SIDE OF BACK CLEAN, 
DRY AND INTACT. PATIENT ABLE TO MAKE NEEDS KNOWN. SAFETY MEASURES IN PLACE, BED LOCKED IN 
LOWEST POSITION, BED ALARM ON, CALL LIGHT WITHIN REACH. WILL CONTINUE TO MONITOR

## 2021-04-28 NOTE — NUR
Patient c/o SOB. V/S 98.4, 116/43, 100, 24, 99%. Blood sugar is 166. Oxygen via nasal 
cannula in place at 2 L/min. TRIXIE Blevins notified. Orders for stat CXR, albuterol/atrovent 
unit dose, and xanax 0.25 mg x 1 received and carried. Family updated, agree with POC.

-------------------------------------------------------------------------------

Addendum: 04/28/21 at 1418 by REGISTRY [facility] INPATIENT RN2 RN

-------------------------------------------------------------------------------

Pluex dressing to left back is c/d/i. Lungs clear, diminished bilaterally.

## 2021-04-28 NOTE — NUR
MS RN CLOSING NOTE



PATIENT IN BED RESTING, EASILY AROUSED. A/O X 4. BREATHING EVEN AND UNLABORED. PATIENT 
CURRENTLY ON 2 L OF SUPPLEMENTAL OXYGEN AS SHE COMPLAINED OF DYSPNEA BECAUSE OF THE 
COUGHING, O2 SATURATION 98%. PATIENT'S PLEURX DRESSING STILL INTACT AND DRY.  DOES NOT 
COMPLAIN OF ANY PAIN AT THIS TIME. ALL ORDERS CARRIED OUT. ALL NEEDS MET AND ATTENDED. 
ROUTINE AND PRN MEDICATIONS GIVEN. ENDORSED TO DAY SHIFT NURSE FOR JORGE.

## 2021-04-28 NOTE — NUR
RN CLOSING NOTE



PATIENT IN BED RESTING, EASILY AROUSED. A/O X 4. BREATHING EVEN AND UNLABORED. PATIENT 
CURRENTLY ON 2 L OF SUPPLEMENTAL OXYGEN AS SHE COMPLAINED OF DYSPNEA BECAUSE OF THE 
COUGHING, O2 SATURATION 98%. AFEBRILE, VSS. PATIENT GIVEN XANAX X 1 AND BREATHING TREATMENT 
PRN. PATIENT'S PLEURX DRESSING STILL INTACT AND DRY.  DOES NOT COMPLAIN OF ANY PAIN AT THIS 
TIME. ALL ORDERS CARRIED OUT. ALL NEEDS MET AND ATTENDED. ROUTINE AND PRN MEDICATIONS GIVEN. 
ENDORSED TO NIGHT SHIFT NURSE FOR OJRGE.

## 2021-04-29 VITALS — SYSTOLIC BLOOD PRESSURE: 112 MMHG | DIASTOLIC BLOOD PRESSURE: 50 MMHG

## 2021-04-29 VITALS — SYSTOLIC BLOOD PRESSURE: 122 MMHG | DIASTOLIC BLOOD PRESSURE: 58 MMHG

## 2021-04-29 LAB
BASOPHILS # BLD AUTO: 0.1 /CMM (ref 0–0.2)
BASOPHILS NFR BLD AUTO: 0.5 % (ref 0–2)
BILIRUB UR QL STRIP: NEGATIVE
BUN SERPL-MCNC: 29 MG/DL (ref 7–18)
CALCIUM SERPL-MCNC: 9.1 MG/DL (ref 8.5–10.1)
CHLORIDE SERPL-SCNC: 99 MMOL/L (ref 98–107)
CO2 SERPL-SCNC: 29 MMOL/L (ref 21–32)
COLOR UR: YELLOW
CREAT SERPL-MCNC: 1.5 MG/DL (ref 0.6–1.3)
EOSINOPHIL NFR BLD AUTO: 2.5 % (ref 0–6)
GLUCOSE SERPL-MCNC: 184 MG/DL (ref 74–106)
GLUCOSE UR STRIP-MCNC: NEGATIVE MG/DL
HCT VFR BLD AUTO: 30 % (ref 33–45)
HGB BLD-MCNC: 9.6 G/DL (ref 11.5–14.8)
LEUKOCYTE ESTERASE UR QL STRIP: NEGATIVE
LYMPHOCYTES NFR BLD AUTO: 1.4 /CMM (ref 0.8–4.8)
LYMPHOCYTES NFR BLD AUTO: 10 % (ref 20–44)
MAGNESIUM SERPL-MCNC: 2.1 MG/DL (ref 1.8–2.4)
MCHC RBC AUTO-ENTMCNC: 32 G/DL (ref 31–36)
MCV RBC AUTO: 87 FL (ref 82–100)
MONOCYTES NFR BLD AUTO: 1.6 /CMM (ref 0.1–1.3)
MONOCYTES NFR BLD AUTO: 11.6 % (ref 2–12)
NEUTROPHILS # BLD AUTO: 10.3 /CMM (ref 1.8–8.9)
NEUTROPHILS NFR BLD AUTO: 75.4 % (ref 43–81)
NITRITE UR QL STRIP: NEGATIVE
PH UR STRIP: 5.5 [PH] (ref 5–8)
PHOSPHATE SERPL-MCNC: 5.3 MG/DL (ref 2.5–4.9)
PLATELET # BLD AUTO: 363 /CMM (ref 150–450)
POTASSIUM SERPL-SCNC: 4.8 MMOL/L (ref 3.5–5.1)
PROT UR QL STRIP: NEGATIVE MG/DL
RBC # BLD AUTO: 3.39 MIL/UL (ref 4–5.2)
RBC #/AREA URNS HPF: (no result) /HPF (ref 0–2)
SODIUM SERPL-SCNC: 135 MMOL/L (ref 136–145)
UROBILINOGEN UR STRIP-MCNC: 0.2 EU/DL
WBC #/AREA URNS HPF: (no result) /HPF (ref 0–3)
WBC NRBC COR # BLD AUTO: 13.6 K/UL (ref 4.3–11)

## 2021-04-29 RX ADMIN — LINAGLIPTIN SCH MG: 5 TABLET, FILM COATED ORAL at 09:24

## 2021-04-29 RX ADMIN — INSULIN HUMAN PRN UNITS: 100 INJECTION, SOLUTION PARENTERAL at 17:30

## 2021-04-29 RX ADMIN — Medication SCH ML: at 12:25

## 2021-04-29 RX ADMIN — Medication SCH ML: at 08:31

## 2021-04-29 RX ADMIN — PANTOPRAZOLE SODIUM SCH MG: 40 TABLET, DELAYED RELEASE ORAL at 08:30

## 2021-04-29 RX ADMIN — Medication SCH EACH: at 17:30

## 2021-04-29 RX ADMIN — AMLODIPINE BESYLATE SCH MG: 5 TABLET ORAL at 09:24

## 2021-04-29 RX ADMIN — TAMOXIFEN CITRATE SCH MG: 10 TABLET, FILM COATED ORAL at 09:23

## 2021-04-29 RX ADMIN — Medication SCH EACH: at 07:38

## 2021-04-29 RX ADMIN — METFORMIN HYDROCHLORIDE SCH MG: 500 TABLET, FILM COATED ORAL at 09:23

## 2021-04-29 RX ADMIN — CLOPIDOGREL BISULFATE SCH MG: 75 TABLET, FILM COATED ORAL at 09:23

## 2021-04-29 RX ADMIN — METFORMIN HYDROCHLORIDE SCH MG: 500 TABLET, FILM COATED ORAL at 17:02

## 2021-04-29 RX ADMIN — VALSARTAN SCH MG: 80 TABLET ORAL at 09:23

## 2021-04-29 RX ADMIN — Medication SCH EACH: at 11:51

## 2021-04-29 RX ADMIN — INSULIN HUMAN PRN UNITS: 100 INJECTION, SOLUTION PARENTERAL at 12:03

## 2021-04-29 RX ADMIN — Medication SCH ML: at 17:02

## 2021-04-29 NOTE — NUR
RN NOTES



PATIENT TRIED TO PLACED ON ROOM AIR, NOTED DESATURATING BELOW 88% AT REST. PLACED BACK ON  
N/C AT 2LPM, O2 SAT WENT UP TO 96-97%. WILL CONTINUE TO MONITOR.

## 2021-04-29 NOTE — NUR
MS RN NOTE 



PATIENT BLOOD GLUCOSE LEVEL . PATIENT REFUSED INSULIN. RISKS AND BENEFITS EXPLAINED TO 
THE PATIENT. PATIENT VERBALIZED UNDERSTANDING

## 2021-04-29 NOTE — NUR
MS/RN OPENING NOTE



RECEIVED PATIENT RESTING IN BED. AWAKE, ALERT AND ORIENTED X 4. ABLE TO MAKE NEEDS KNOWN. NO 
COMPLAINTS OF PAIN AT THIS TIME. MIDLINE TO RIGHT UPPER ARM INTACT AND PATENT. CONTINUES ON 
O2 2L VIA NC WITH NO SIGNS OR SYMPTOMS OF RESPIRATORY DISTRESS NOTED. PLEUREX DRESSING TO 
LEFT POSTERIOR BACK CLEAN, DRY AND INTACT. CALL LIGHT WITHIN REACH. ASPIRATION, FALL AND 
SAFETY PRECAUTIONS MAINTAINED. WILL CONTINUE TO MONITOR.

## 2021-04-29 NOTE — NUR
MS RN CLOSING NOTE



PATIENT PLACE IN  BED, AWAITING FOR DISCHARGE, ALERT AND ORIENTED X 4. NO ACUTE DISTRESS OR 
SHORTNESS OF BREATH NOTED. PATIENT ON 2L OF OXYGEN VIA NASAL CANNULA.NO  COMPLAIN OF PAIN 
AND DISCOMFORT .AMBULTORY WITH ASSISTANCE,  RIGHT UPPER ARM MIDLINE #18G CLEAN, DRY AND 
INTACT. PLEURX ON LEFT SIDE OF BACK CLEAN, DRY AND INTACT. PATIENT ABLE TO MAKE NEEDS KNOWN. 
MEDICATIONS GIVEN AS ORDER AND ALL NEEDS MET, BED IN LOW POSITION,, BED ALARM ON, CALL LIGHT 
WITHIN REACH. . PORTABLE OXYGEN WITH PATIENT TO BE TAKEN HOME, WILL ENDORSE TO DAY SHIFT 
NURSE FOR CONTINUITY OF CARE

## 2021-04-29 NOTE — NUR
MS RN OPENING  NOTE



RECEIVED PATIENT AWAKE IN BED, A/O X4X 4. NO ACUTE DISTRESS OR SHORTNESS OF BREATH NOTED. 
PATIENT ON 2L OF OXYGEN VIA NASAL CANNULA. NO COMPLAIN OF PAIN AND DISCOMFORT AT THIS TIME. 
RIGHT UPPER ARM MIDLINE #18G CLEAN, DRY AND INTACT. PLEURX ON LEFT SIDE OF BACK CLEAN, DRY 
AND INTACT. PATIENT ABLE TO MAKE NEEDS KNOWN. SAFETY MEASURES IN PLACE, BED LOCKED AND IN 
LOW POSITION, BED ALARM ON, CALL LIGHT WITHIN REACH. WILL CONTINUE TO MONITOR.

## 2021-04-29 NOTE — NUR
MS RN CLOSING NOTE



PATIENT RESTING IN BED, ALERT AND ORIENTED X 4. NO ACUTE DISTRESS OR SHORTNESS OF BREATH 
NOTED. PATIENT ON 2L OF OXYGEN VIA NASAL CANNULA. NO REPORTS OF PAIN AT THIS TIME. RIGHT 
UPPER ARM MIDLINE #18G CLEAN, DRY AND INTACT. PLEURX ON LEFT SIDE OF BACK CLEAN, DRY AND 
INTACT. PATIENT ABLE TO MAKE NEEDS KNOWN. MEDICATIONS GIVEN AS ORDER AND ALL NEEDS MET 
THROUGHOUT SHIFT. SAFETY MEASURES IN PLACE, BED LOCKED IN LOWEST POSITION, BED ALARM ON, 
CALL LIGHT WITHIN REACH. WILL ENDORSE TO DAY SHIFT NURSE FOR CONTINUITY OF CARE.

## 2021-04-29 NOTE — NUR
MS/RN DISCHARGE NOTE



PATIENT DISCHARGED WITH SON AT APPROX. 20:30. PATIENT ALERT AND ORIENTED X 4. NO COMPLAINTS 
OF PAIN NOTED. REMOVED MIDLINE IV TO RIGHT UPPER ARM WITH TIP INTACT AND PATIENT TOLERATING 
WELL. PRESSURE DRESSING APPLIED. DRESSING TO PLEURX CATHETER CLEAN, DRY AND INTACT. PATIENT 
SIGNED BOTH DISCHARGE PAPERWORK AND BELONGINGS LIST. PATIENT SENT HOME WITH ALL BELONGINGS. 
EDUCATED SON ON HOW TO PROPERLY USE THE PLEURX CATHETER AND DRAINAGE SYSTEM WITH SON 
VERBALIZING UNDERSTANDING. EDUCATED PATIENT AND SON ON IMPORTANCE OF FOLLOWING UP WITH 
ONCOLOGIST WITH VERBAL AGREEMENT. SON EDUCATED ON PORTABLE O2 SYSTEM WITH RETURN 
DEMONSTRATION. ID BAND REMOVED FROM PATIENT. PATIENT BROUGHT DOWN TO LOBBY VIA WHEELCHAIR 
AND ASSISTED TO CAR.